# Patient Record
Sex: FEMALE | Race: BLACK OR AFRICAN AMERICAN | Employment: OTHER | URBAN - NONMETROPOLITAN AREA
[De-identification: names, ages, dates, MRNs, and addresses within clinical notes are randomized per-mention and may not be internally consistent; named-entity substitution may affect disease eponyms.]

---

## 2022-01-01 ENCOUNTER — HOSPITAL ENCOUNTER (EMERGENCY)
Age: 67
Discharge: ANOTHER HOSPITAL | End: 2022-11-10
Attending: INTERNAL MEDICINE | Admitting: EMERGENCY MEDICINE
Payer: MEDICARE

## 2022-01-01 ENCOUNTER — APPOINTMENT (OUTPATIENT)
Dept: GENERAL RADIOLOGY | Age: 67
End: 2022-01-01
Attending: INTERNAL MEDICINE
Payer: MEDICARE

## 2022-01-01 ENCOUNTER — HOSPITAL ENCOUNTER (OUTPATIENT)
Age: 67
Setting detail: OBSERVATION
End: 2022-11-21
Attending: EMERGENCY MEDICINE | Admitting: INTERNAL MEDICINE
Payer: MEDICARE

## 2022-01-01 VITALS
HEART RATE: 124 BPM | SYSTOLIC BLOOD PRESSURE: 146 MMHG | OXYGEN SATURATION: 95 % | BODY MASS INDEX: 16.01 KG/M2 | DIASTOLIC BLOOD PRESSURE: 70 MMHG | TEMPERATURE: 98.6 F | WEIGHT: 102 LBS | RESPIRATION RATE: 20 BRPM | HEIGHT: 67 IN

## 2022-01-01 VITALS
RESPIRATION RATE: 18 BRPM | OXYGEN SATURATION: 93 % | SYSTOLIC BLOOD PRESSURE: 95 MMHG | BODY MASS INDEX: 17.23 KG/M2 | DIASTOLIC BLOOD PRESSURE: 70 MMHG | WEIGHT: 110 LBS | TEMPERATURE: 97.6 F | HEART RATE: 82 BPM

## 2022-01-01 DIAGNOSIS — R69 TERMINAL ILLNESS: Primary | ICD-10-CM

## 2022-01-01 DIAGNOSIS — A41.9 SEPSIS WITHOUT ACUTE ORGAN DYSFUNCTION, DUE TO UNSPECIFIED ORGANISM (HCC): Primary | ICD-10-CM

## 2022-01-01 DIAGNOSIS — J18.9 PNEUMONIA OF RIGHT LUNG DUE TO INFECTIOUS ORGANISM, UNSPECIFIED PART OF LUNG: ICD-10-CM

## 2022-01-01 LAB
ALBUMIN SERPL-MCNC: 2 G/DL (ref 3.4–5)
ALBUMIN/GLOB SERPL: 0.4 {RATIO} (ref 0.8–1.7)
ALP SERPL-CCNC: 75 U/L (ref 45–117)
ALT SERPL-CCNC: 8 U/L (ref 13–56)
ANION GAP SERPL CALC-SCNC: 9 MMOL/L (ref 3–18)
AST SERPL W P-5'-P-CCNC: 7 U/L (ref 10–38)
ATRIAL RATE: 129 BPM
BACTERIA SPEC CULT: NORMAL
BACTERIA SPEC CULT: NORMAL
BASOPHILS # BLD: 0 K/UL (ref 0–0.1)
BASOPHILS NFR BLD: 0 % (ref 0–2)
BILIRUB SERPL-MCNC: 0.6 MG/DL (ref 0.2–1)
BUN SERPL-MCNC: 18 MG/DL (ref 7–18)
BUN/CREAT SERPL: 40 (ref 12–20)
CA-I BLD-MCNC: 8.4 MG/DL (ref 8.5–10.1)
CALCULATED P AXIS, ECG09: 67 DEGREES
CALCULATED R AXIS, ECG10: 62 DEGREES
CALCULATED T AXIS, ECG11: -87 DEGREES
CHLORIDE SERPL-SCNC: 104 MMOL/L (ref 100–111)
CO2 SERPL-SCNC: 27 MMOL/L (ref 21–32)
CREAT SERPL-MCNC: 0.45 MG/DL (ref 0.6–1.3)
DIAGNOSIS, 93000: NORMAL
DIFFERENTIAL METHOD BLD: ABNORMAL
EOSINOPHIL # BLD: 5.8 K/UL (ref 0–0.4)
EOSINOPHIL NFR BLD: 11 % (ref 0–5)
ERYTHROCYTE [DISTWIDTH] IN BLOOD BY AUTOMATED COUNT: 18.2 % (ref 11.6–14.5)
GLOBULIN SER CALC-MCNC: 5.1 G/DL (ref 2–4)
GLUCOSE SERPL-MCNC: 134 MG/DL (ref 74–99)
HCT VFR BLD AUTO: 26 % (ref 35–45)
HGB BLD-MCNC: 7.4 G/DL (ref 12–16)
IMM GRANULOCYTES # BLD AUTO: 0 K/UL
IMM GRANULOCYTES NFR BLD AUTO: 0 %
LACTATE SERPL-SCNC: 2.5 MMOL/L (ref 0.4–2)
LACTATE SERPL-SCNC: 2.9 MMOL/L (ref 0.4–2)
LIPASE SERPL-CCNC: 40 U/L (ref 73–393)
LYMPHOCYTES # BLD: 3.2 K/UL (ref 0.9–3.6)
LYMPHOCYTES NFR BLD: 6 % (ref 21–52)
MAGNESIUM SERPL-MCNC: 1.9 MG/DL (ref 1.6–2.6)
MCH RBC QN AUTO: 26.5 PG (ref 24–34)
MCHC RBC AUTO-ENTMCNC: 28.5 G/DL (ref 31–37)
MCV RBC AUTO: 93.2 FL (ref 78–100)
MONOCYTES # BLD: 8 K/UL (ref 0.05–1.2)
MONOCYTES NFR BLD: 15 % (ref 3–10)
NEUTS BAND NFR BLD MANUAL: 6 % (ref 0–5)
NEUTS SEG # BLD: 36 K/UL (ref 1.8–8)
NEUTS SEG NFR BLD: 62 % (ref 40–73)
NRBC # BLD: 0.02 K/UL (ref 0–0.01)
NRBC BLD-RTO: 0 PER 100 WBC
P-R INTERVAL, ECG05: 110 MS
PLATELET # BLD AUTO: 353 K/UL (ref 135–420)
PLATELET COMMENTS,PCOM: ABNORMAL
PMV BLD AUTO: 12.7 FL (ref 9.2–11.8)
POTASSIUM SERPL-SCNC: 3.4 MMOL/L (ref 3.5–5.5)
PROCALCITONIN SERPL-MCNC: 0.18 NG/ML
PROT SERPL-MCNC: 7.1 G/DL (ref 6.4–8.2)
Q-T INTERVAL, ECG07: 316 MS
QRS DURATION, ECG06: 71 MS
QTC CALCULATION (BEZET), ECG08: 463 MS
RBC # BLD AUTO: 2.79 M/UL (ref 4.2–5.3)
RBC MORPH BLD: ABNORMAL
SODIUM SERPL-SCNC: 140 MMOL/L (ref 136–145)
SPECIAL REQUESTS,SREQ: NORMAL
SPECIAL REQUESTS,SREQ: NORMAL
TROPONIN-HIGH SENSITIVITY: 9 NG/L (ref 0–54)
VENTRICULAR RATE, ECG03: 129 BPM
WBC # BLD AUTO: 53 K/UL (ref 4.6–13.2)

## 2022-01-01 PROCEDURE — 84145 PROCALCITONIN (PCT): CPT

## 2022-01-01 PROCEDURE — 74011250636 HC RX REV CODE- 250/636: Performed by: INTERNAL MEDICINE

## 2022-01-01 PROCEDURE — G0378 HOSPITAL OBSERVATION PER HR: HCPCS

## 2022-01-01 PROCEDURE — 83735 ASSAY OF MAGNESIUM: CPT

## 2022-01-01 PROCEDURE — 93005 ELECTROCARDIOGRAM TRACING: CPT

## 2022-01-01 PROCEDURE — 87040 BLOOD CULTURE FOR BACTERIA: CPT

## 2022-01-01 PROCEDURE — 96365 THER/PROPH/DIAG IV INF INIT: CPT

## 2022-01-01 PROCEDURE — 84484 ASSAY OF TROPONIN QUANT: CPT

## 2022-01-01 PROCEDURE — 83690 ASSAY OF LIPASE: CPT

## 2022-01-01 PROCEDURE — 96374 THER/PROPH/DIAG INJ IV PUSH: CPT

## 2022-01-01 PROCEDURE — 83605 ASSAY OF LACTIC ACID: CPT

## 2022-01-01 PROCEDURE — 71045 X-RAY EXAM CHEST 1 VIEW: CPT

## 2022-01-01 PROCEDURE — 96376 TX/PRO/DX INJ SAME DRUG ADON: CPT

## 2022-01-01 PROCEDURE — 80053 COMPREHEN METABOLIC PANEL: CPT

## 2022-01-01 PROCEDURE — 96375 TX/PRO/DX INJ NEW DRUG ADDON: CPT

## 2022-01-01 PROCEDURE — 85025 COMPLETE CBC W/AUTO DIFF WBC: CPT

## 2022-01-01 PROCEDURE — 74011250636 HC RX REV CODE- 250/636: Performed by: EMERGENCY MEDICINE

## 2022-01-01 PROCEDURE — 99285 EMERGENCY DEPT VISIT HI MDM: CPT

## 2022-01-01 PROCEDURE — 74011000258 HC RX REV CODE- 258: Performed by: INTERNAL MEDICINE

## 2022-01-01 RX ORDER — LEVOFLOXACIN 5 MG/ML
750 INJECTION, SOLUTION INTRAVENOUS EVERY 24 HOURS
Status: DISCONTINUED | OUTPATIENT
Start: 2022-01-01 | End: 2022-01-01

## 2022-01-01 RX ORDER — OXYCODONE AND ACETAMINOPHEN 5; 325 MG/1; MG/1
2 TABLET ORAL
Status: DISCONTINUED | OUTPATIENT
Start: 2022-01-01 | End: 2022-11-22 | Stop reason: HOSPADM

## 2022-01-01 RX ORDER — LEVOFLOXACIN 5 MG/ML
750 INJECTION, SOLUTION INTRAVENOUS ONCE
Status: COMPLETED | OUTPATIENT
Start: 2022-01-01 | End: 2022-01-01

## 2022-01-01 RX ORDER — SODIUM CHLORIDE 0.9 % (FLUSH) 0.9 %
5-10 SYRINGE (ML) INJECTION AS NEEDED
Status: DISCONTINUED | OUTPATIENT
Start: 2022-01-01 | End: 2022-01-01 | Stop reason: HOSPADM

## 2022-01-01 RX ORDER — MORPHINE SULFATE 2 MG/ML
2 INJECTION, SOLUTION INTRAMUSCULAR; INTRAVENOUS
Status: DISCONTINUED | OUTPATIENT
Start: 2022-01-01 | End: 2022-11-22 | Stop reason: HOSPADM

## 2022-01-01 RX ORDER — MORPHINE SULFATE 4 MG/ML
4 INJECTION, SOLUTION INTRAMUSCULAR; INTRAVENOUS
Status: DISPENSED | OUTPATIENT
Start: 2022-01-01 | End: 2022-01-01

## 2022-01-01 RX ORDER — ONDANSETRON 2 MG/ML
4 INJECTION INTRAMUSCULAR; INTRAVENOUS
Status: DISPENSED | OUTPATIENT
Start: 2022-01-01 | End: 2022-01-01

## 2022-01-01 RX ADMIN — VANCOMYCIN HYDROCHLORIDE 1000 MG: 1 INJECTION, POWDER, LYOPHILIZED, FOR SOLUTION INTRAVENOUS at 21:12

## 2022-01-01 RX ADMIN — MORPHINE SULFATE 2 MG: 2 INJECTION, SOLUTION INTRAMUSCULAR; INTRAVENOUS at 04:54

## 2022-01-01 RX ADMIN — MORPHINE SULFATE 2 MG: 2 INJECTION, SOLUTION INTRAMUSCULAR; INTRAVENOUS at 17:34

## 2022-01-01 RX ADMIN — SODIUM CHLORIDE 389 ML: 9 INJECTION, SOLUTION INTRAVENOUS at 17:18

## 2022-01-01 RX ADMIN — LEVOFLOXACIN 750 MG: 5 INJECTION, SOLUTION INTRAVENOUS at 18:20

## 2022-01-01 RX ADMIN — MORPHINE SULFATE 2 MG: 2 INJECTION, SOLUTION INTRAMUSCULAR; INTRAVENOUS at 08:25

## 2022-01-01 RX ADMIN — SODIUM CHLORIDE 1000 ML: 9 INJECTION, SOLUTION INTRAVENOUS at 17:16

## 2022-01-01 RX ADMIN — SODIUM CHLORIDE 500 ML: 9 INJECTION, SOLUTION INTRAVENOUS at 15:45

## 2022-01-01 RX ADMIN — PIPERACILLIN AND TAZOBACTAM 4.5 G: 4; .5 INJECTION, POWDER, FOR SOLUTION INTRAVENOUS at 17:29

## 2022-01-01 RX ADMIN — MORPHINE SULFATE 2 MG: 2 INJECTION, SOLUTION INTRAMUSCULAR; INTRAVENOUS at 15:36

## 2022-01-01 RX ADMIN — MORPHINE SULFATE 2 MG: 2 INJECTION, SOLUTION INTRAMUSCULAR; INTRAVENOUS at 02:50

## 2022-01-01 RX ADMIN — SODIUM CHLORIDE 1000 ML: 9 INJECTION, SOLUTION INTRAVENOUS at 20:23

## 2022-01-01 RX ADMIN — MORPHINE SULFATE 2 MG: 2 INJECTION, SOLUTION INTRAMUSCULAR; INTRAVENOUS at 19:09

## 2022-07-31 ENCOUNTER — APPOINTMENT (OUTPATIENT)
Dept: CT IMAGING | Age: 67
DRG: 312 | End: 2022-07-31
Attending: EMERGENCY MEDICINE
Payer: MEDICARE

## 2022-07-31 ENCOUNTER — APPOINTMENT (OUTPATIENT)
Dept: CT IMAGING | Age: 67
DRG: 312 | End: 2022-07-31
Attending: NURSE PRACTITIONER
Payer: MEDICARE

## 2022-07-31 ENCOUNTER — HOSPITAL ENCOUNTER (INPATIENT)
Age: 67
LOS: 2 days | Discharge: HOME HEALTH | DRG: 312 | End: 2022-08-02
Attending: EMERGENCY MEDICINE | Admitting: INTERNAL MEDICINE
Payer: MEDICARE

## 2022-07-31 ENCOUNTER — APPOINTMENT (OUTPATIENT)
Dept: GENERAL RADIOLOGY | Age: 67
DRG: 312 | End: 2022-07-31
Attending: EMERGENCY MEDICINE
Payer: MEDICARE

## 2022-07-31 DIAGNOSIS — R55 SYNCOPE AND COLLAPSE: ICD-10-CM

## 2022-07-31 DIAGNOSIS — U07.1 COVID-19: Primary | ICD-10-CM

## 2022-07-31 DIAGNOSIS — C34.11 MALIGNANT NEOPLASM OF UPPER LOBE OF RIGHT LUNG (HCC): ICD-10-CM

## 2022-07-31 PROBLEM — R91.8 LUNG MASS: Status: ACTIVE | Noted: 2022-07-31

## 2022-07-31 LAB
ALBUMIN SERPL-MCNC: 2.7 G/DL (ref 3.4–5)
ALBUMIN/GLOB SERPL: 0.6 {RATIO} (ref 0.8–1.7)
ALP SERPL-CCNC: 50 U/L (ref 45–117)
ALT SERPL-CCNC: 14 U/L (ref 13–56)
ANION GAP SERPL CALC-SCNC: 10 MMOL/L (ref 3–18)
APPEARANCE UR: CLEAR
AST SERPL W P-5'-P-CCNC: 33 U/L (ref 10–38)
ATRIAL RATE: 112 BPM
BACTERIA URNS QL MICRO: NEGATIVE /HPF
BASOPHILS # BLD: 0 K/UL (ref 0–0.1)
BASOPHILS NFR BLD: 0 % (ref 0–2)
BILIRUB SERPL-MCNC: 1 MG/DL (ref 0.2–1)
BILIRUB UR QL: NEGATIVE
BUN SERPL-MCNC: 20 MG/DL (ref 7–18)
BUN/CREAT SERPL: 19 (ref 12–20)
CA-I BLD-MCNC: 8.4 MG/DL (ref 8.5–10.1)
CALCULATED P AXIS, ECG09: 70 DEGREES
CALCULATED R AXIS, ECG10: 54 DEGREES
CALCULATED T AXIS, ECG11: 33 DEGREES
CHLORIDE SERPL-SCNC: 104 MMOL/L (ref 100–111)
CO2 SERPL-SCNC: 22 MMOL/L (ref 21–32)
COLOR UR: YELLOW
COVID-19 RAPID TEST, COVR: DETECTED
CREAT SERPL-MCNC: 1.05 MG/DL (ref 0.6–1.3)
DIAGNOSIS, 93000: NORMAL
DIFFERENTIAL METHOD BLD: ABNORMAL
EOSINOPHIL # BLD: 0.1 K/UL (ref 0–0.4)
EOSINOPHIL NFR BLD: 1 % (ref 0–5)
EPITH CASTS URNS QL MICRO: ABNORMAL /LPF (ref 0–20)
ERYTHROCYTE [DISTWIDTH] IN BLOOD BY AUTOMATED COUNT: 16.3 % (ref 11.6–14.5)
FLUAV AG NPH QL IA: NEGATIVE
FLUBV AG NOSE QL IA: NEGATIVE
GLOBULIN SER CALC-MCNC: 4.8 G/DL (ref 2–4)
GLUCOSE SERPL-MCNC: 133 MG/DL (ref 74–99)
GLUCOSE UR STRIP.AUTO-MCNC: NEGATIVE MG/DL
HCT VFR BLD AUTO: 27.9 % (ref 35–45)
HGB BLD-MCNC: 8.4 G/DL (ref 12–16)
HGB UR QL STRIP: NEGATIVE
IMM GRANULOCYTES # BLD AUTO: 0.1 K/UL (ref 0–0.04)
IMM GRANULOCYTES NFR BLD AUTO: 1 % (ref 0–0.5)
KETONES UR QL STRIP.AUTO: NEGATIVE MG/DL
LACTATE SERPL-SCNC: 0.8 MMOL/L (ref 0.4–2)
LACTATE SERPL-SCNC: 1.9 MMOL/L (ref 0.4–2)
LEUKOCYTE ESTERASE UR QL STRIP.AUTO: NEGATIVE
LYMPHOCYTES # BLD: 0.6 K/UL (ref 0.9–3.6)
LYMPHOCYTES NFR BLD: 5 % (ref 21–52)
MCH RBC QN AUTO: 26.3 PG (ref 24–34)
MCHC RBC AUTO-ENTMCNC: 30.1 G/DL (ref 31–37)
MCV RBC AUTO: 87.5 FL (ref 78–100)
MONOCYTES # BLD: 0.6 K/UL (ref 0.05–1.2)
MONOCYTES NFR BLD: 5 % (ref 3–10)
NEUTS SEG # BLD: 10.5 K/UL (ref 1.8–8)
NEUTS SEG NFR BLD: 88 % (ref 40–73)
NITRITE UR QL STRIP.AUTO: NEGATIVE
NRBC # BLD: 0 K/UL (ref 0–0.01)
NRBC BLD-RTO: 0 PER 100 WBC
P-R INTERVAL, ECG05: 117 MS
PH UR STRIP: 6 [PH] (ref 5–8)
PLATELET # BLD AUTO: 340 K/UL (ref 135–420)
PMV BLD AUTO: 12.1 FL (ref 9.2–11.8)
POTASSIUM SERPL-SCNC: 3.7 MMOL/L (ref 3.5–5.5)
PROT SERPL-MCNC: 7.5 G/DL (ref 6.4–8.2)
PROT UR STRIP-MCNC: ABNORMAL MG/DL
Q-T INTERVAL, ECG07: 303 MS
QRS DURATION, ECG06: 74 MS
QTC CALCULATION (BEZET), ECG08: 414 MS
RBC # BLD AUTO: 3.19 M/UL (ref 4.2–5.3)
RBC #/AREA URNS HPF: ABNORMAL /HPF (ref 0–2)
SODIUM SERPL-SCNC: 136 MMOL/L (ref 136–145)
SP GR UR REFRACTOMETRY: 1.02 (ref 1–1.03)
UROBILINOGEN UR QL STRIP.AUTO: 1 EU/DL (ref 0.2–1)
VENTRICULAR RATE, ECG03: 112 BPM
WBC # BLD AUTO: 12 K/UL (ref 4.6–13.2)
WBC URNS QL MICRO: ABNORMAL /HPF (ref 0–4)

## 2022-07-31 PROCEDURE — 70450 CT HEAD/BRAIN W/O DYE: CPT

## 2022-07-31 PROCEDURE — 87635 SARS-COV-2 COVID-19 AMP PRB: CPT

## 2022-07-31 PROCEDURE — 87040 BLOOD CULTURE FOR BACTERIA: CPT

## 2022-07-31 PROCEDURE — 80053 COMPREHEN METABOLIC PANEL: CPT

## 2022-07-31 PROCEDURE — 85025 COMPLETE CBC W/AUTO DIFF WBC: CPT

## 2022-07-31 PROCEDURE — 83605 ASSAY OF LACTIC ACID: CPT

## 2022-07-31 PROCEDURE — 74011250636 HC RX REV CODE- 250/636: Performed by: EMERGENCY MEDICINE

## 2022-07-31 PROCEDURE — 93005 ELECTROCARDIOGRAM TRACING: CPT

## 2022-07-31 PROCEDURE — 74011000636 HC RX REV CODE- 636: Performed by: EMERGENCY MEDICINE

## 2022-07-31 PROCEDURE — 87804 INFLUENZA ASSAY W/OPTIC: CPT

## 2022-07-31 PROCEDURE — 71275 CT ANGIOGRAPHY CHEST: CPT

## 2022-07-31 PROCEDURE — 74011250636 HC RX REV CODE- 250/636: Performed by: NURSE PRACTITIONER

## 2022-07-31 PROCEDURE — 74011000250 HC RX REV CODE- 250: Performed by: NURSE PRACTITIONER

## 2022-07-31 PROCEDURE — 81001 URINALYSIS AUTO W/SCOPE: CPT

## 2022-07-31 PROCEDURE — 99285 EMERGENCY DEPT VISIT HI MDM: CPT

## 2022-07-31 PROCEDURE — 74011250637 HC RX REV CODE- 250/637: Performed by: EMERGENCY MEDICINE

## 2022-07-31 PROCEDURE — 65270000029 HC RM PRIVATE

## 2022-07-31 PROCEDURE — 71045 X-RAY EXAM CHEST 1 VIEW: CPT

## 2022-07-31 RX ORDER — SODIUM CHLORIDE 9 MG/ML
75 INJECTION, SOLUTION INTRAVENOUS CONTINUOUS
Status: DISCONTINUED | OUTPATIENT
Start: 2022-07-31 | End: 2022-08-02

## 2022-07-31 RX ORDER — ONDANSETRON 4 MG/1
4 TABLET, ORALLY DISINTEGRATING ORAL
Status: DISCONTINUED | OUTPATIENT
Start: 2022-07-31 | End: 2022-08-02 | Stop reason: HOSPADM

## 2022-07-31 RX ORDER — SODIUM CHLORIDE 0.9 % (FLUSH) 0.9 %
5-10 SYRINGE (ML) INJECTION AS NEEDED
Status: DISCONTINUED | OUTPATIENT
Start: 2022-07-31 | End: 2022-08-02 | Stop reason: HOSPADM

## 2022-07-31 RX ORDER — SODIUM CHLORIDE 0.9 % (FLUSH) 0.9 %
5-40 SYRINGE (ML) INJECTION AS NEEDED
Status: DISCONTINUED | OUTPATIENT
Start: 2022-07-31 | End: 2022-08-02 | Stop reason: HOSPADM

## 2022-07-31 RX ORDER — ACETAMINOPHEN 325 MG/1
650 TABLET ORAL
Status: DISCONTINUED | OUTPATIENT
Start: 2022-07-31 | End: 2022-08-02 | Stop reason: HOSPADM

## 2022-07-31 RX ORDER — SODIUM CHLORIDE 0.9 % (FLUSH) 0.9 %
5-40 SYRINGE (ML) INJECTION EVERY 8 HOURS
Status: DISCONTINUED | OUTPATIENT
Start: 2022-07-31 | End: 2022-08-02 | Stop reason: HOSPADM

## 2022-07-31 RX ORDER — ONDANSETRON 2 MG/ML
4 INJECTION INTRAMUSCULAR; INTRAVENOUS
Status: DISCONTINUED | OUTPATIENT
Start: 2022-07-31 | End: 2022-08-02 | Stop reason: HOSPADM

## 2022-07-31 RX ORDER — POLYETHYLENE GLYCOL 3350 17 G/17G
17 POWDER, FOR SOLUTION ORAL DAILY PRN
Status: DISCONTINUED | OUTPATIENT
Start: 2022-07-31 | End: 2022-08-02 | Stop reason: HOSPADM

## 2022-07-31 RX ORDER — ACETAMINOPHEN 500 MG
1000 TABLET ORAL
Status: COMPLETED | OUTPATIENT
Start: 2022-07-31 | End: 2022-07-31

## 2022-07-31 RX ORDER — ACETAMINOPHEN 650 MG/1
650 SUPPOSITORY RECTAL
Status: DISCONTINUED | OUTPATIENT
Start: 2022-07-31 | End: 2022-08-02 | Stop reason: HOSPADM

## 2022-07-31 RX ORDER — ENOXAPARIN SODIUM 100 MG/ML
40 INJECTION SUBCUTANEOUS DAILY
Status: DISCONTINUED | OUTPATIENT
Start: 2022-08-01 | End: 2022-08-02

## 2022-07-31 RX ADMIN — IOPAMIDOL 100 ML: 755 INJECTION, SOLUTION INTRAVENOUS at 16:40

## 2022-07-31 RX ADMIN — ACETAMINOPHEN 1000 MG: 500 TABLET ORAL at 16:01

## 2022-07-31 RX ADMIN — SODIUM CHLORIDE 75 ML/HR: 9 INJECTION, SOLUTION INTRAVENOUS at 22:03

## 2022-07-31 RX ADMIN — SODIUM CHLORIDE, PRESERVATIVE FREE 10 ML: 5 INJECTION INTRAVENOUS at 22:03

## 2022-07-31 RX ADMIN — SODIUM CHLORIDE 1000 ML: 9 INJECTION, SOLUTION INTRAVENOUS at 16:00

## 2022-07-31 NOTE — ASSESSMENT & PLAN NOTE
-patient has been accepted to [facility], but no beds available at this time  -CT Chest: there is a large suprahilar right upper lobe mass measuring 8.3 cm, almost certainly representing primary malignancy. Additional metastatic right upper lobe pulmonary nodules. 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  Metastatic mediastinal and right hilar  lymphadenopathy

## 2022-07-31 NOTE — ED PROVIDER NOTES
The patient is a 73-year-old woman with past medical history significant for chronic bronchitis and hypertension who was brought to the ED today by EMS after having a syncopal event in [address] [place] [address].  She states that she was feeling weak and dizzy. After that she passed out. She states that she has been moving for the past 3 days and has not really had much to eat or drink. She is also complaining of a cough. She denies any nausea, vomiting or diarrhea. She also denies any chest pain or heart palpitations. Past Medical History:   Diagnosis Date    Hypertension        History reviewed. No pertinent surgical history. History reviewed. No pertinent family history. Social History     Socioeconomic History    Marital status: SINGLE     Spouse name: Not on file    Number of children: Not on file    Years of education: Not on file    Highest education level: Not on file   Occupational History    Not on file   Tobacco Use    Smoking status: Former     Packs/day: 0.25     Types: Cigarettes    Smokeless tobacco: Never   Vaping Use    Vaping Use: Not on file   Substance and Sexual Activity    Alcohol use: Not Currently    Drug use: Not Currently    Sexual activity: Not Currently   Other Topics Concern    Not on file   Social History Narrative    Not on file     Social Determinants of Health     Financial Resource Strain: Not on file   Food Insecurity: Not on file   Transportation Needs: Not on file   Physical Activity: Not on file   Stress: Not on file   Social Connections: Not on file   Intimate Partner Violence: Not on file   Housing Stability: Not on file         ALLERGIES: Patient has no known allergies. Review of Systems   All other systems reviewed and are negative.     Vitals:    07/31/22 1505 07/31/22 1508 07/31/22 1531   BP:  114/73    Pulse: (!) 116     Resp: 18     Temp: (!) 101.3 °F (38.5 °C)     SpO2: 98%  98%   Weight: 66.2 kg (146 lb)     Height: 5' 7\" (1.702 m)              Physical Exam  Vitals and nursing note reviewed. Constitutional:       Appearance: She is normal weight. HENT:      Head: Normocephalic and atraumatic. Right Ear: External ear normal.      Left Ear: External ear normal.      Nose: Nose normal.      Mouth/Throat:      Mouth: Mucous membranes are moist.      Pharynx: Oropharynx is clear. Eyes:      Extraocular Movements: Extraocular movements intact. Conjunctiva/sclera: Conjunctivae normal.      Pupils: Pupils are equal, round, and reactive to light. Cardiovascular:      Rate and Rhythm: Regular rhythm. Tachycardia present. Pulses: Normal pulses. Heart sounds: Normal heart sounds. Pulmonary:      Effort: Pulmonary effort is normal.      Breath sounds: Normal breath sounds. Abdominal:      General: Abdomen is flat. Bowel sounds are normal.      Palpations: Abdomen is soft. Musculoskeletal:         General: Normal range of motion. Cervical back: Normal range of motion and neck supple. Skin:     General: Skin is warm and dry. Capillary Refill: Capillary refill takes less than 2 seconds. Neurological:      General: No focal deficit present. Mental Status: She is alert and oriented to person, place, and time. Psychiatric:         Mood and Affect: Mood normal.         Behavior: Behavior normal.         Thought Content:  Thought content normal.         Judgment: Judgment normal.          Recent Results (from the past 12 hour(s))   EKG, 12 LEAD, INITIAL    Collection Time: 07/31/22  3:12 PM   Result Value Ref Range    Ventricular Rate 112 BPM    Atrial Rate 112 BPM    P-R Interval 117 ms    QRS Duration 74 ms    Q-T Interval 303 ms    QTC Calculation (Bezet) 414 ms    Calculated P Axis 70 degrees    Calculated R Axis 54 degrees    Calculated T Axis 33 degrees    Diagnosis       Sinus tachycardia  Probable left atrial enlargement     METABOLIC PANEL, COMPREHENSIVE    Collection Time: 07/31/22  3:45 PM   Result Value Ref Range Sodium 136 136 - 145 mmol/L    Potassium 3.7 3.5 - 5.5 mmol/L    Chloride 104 100 - 111 mmol/L    CO2 22 21 - 32 mmol/L    Anion gap 10 3.0 - 18.0 mmol/L    Glucose 133 (H) 74 - 99 mg/dL    BUN 20 (H) 7 - 18 mg/dL    Creatinine 1.05 0.60 - 1.30 mg/dL    BUN/Creatinine ratio 19 12 - 20      GFR est AA >60 >60 ml/min/1.73m2    GFR est non-AA 53 (L) >60 ml/min/1.73m2    Calcium 8.4 (L) 8.5 - 10.1 mg/dL    Bilirubin, total 1.0 0.2 - 1.0 mg/dL    AST (SGOT) 33 10 - 38 U/L    ALT (SGPT) 14 13 - 56 U/L    Alk. phosphatase 50 45 - 117 U/L    Protein, total 7.5 6.4 - 8.2 g/dL    Albumin 2.7 (L) 3.4 - 5.0 g/dL    Globulin 4.8 (H) 2.0 - 4.0 g/dL    A-G Ratio 0.6 (L) 0.8 - 1.7     CBC WITH AUTOMATED DIFF    Collection Time: 07/31/22  3:45 PM   Result Value Ref Range    WBC 12.0 4.6 - 13.2 K/uL    RBC 3.19 (L) 4.20 - 5.30 M/uL    HGB 8.4 (L) 12.0 - 16.0 g/dL    HCT 27.9 (L) 35.0 - 45.0 %    MCV 87.5 78.0 - 100.0 FL    MCH 26.3 24.0 - 34.0 PG    MCHC 30.1 (L) 31.0 - 37.0 g/dL    RDW 16.3 (H) 11.6 - 14.5 %    PLATELET 511 479 - 888 K/uL    MPV 12.1 (H) 9.2 - 11.8 FL    NRBC 0.0 0.0  WBC    ABSOLUTE NRBC 0.00 0.00 - 0.01 K/uL    NEUTROPHILS 88 (H) 40 - 73 %    LYMPHOCYTES 5 (L) 21 - 52 %    MONOCYTES 5 3 - 10 %    EOSINOPHILS 1 0 - 5 %    BASOPHILS 0 0 - 2 %    IMMATURE GRANULOCYTES 1 (H) 0 - 0.5 %    ABS. NEUTROPHILS 10.5 (H) 1.8 - 8.0 K/UL    ABS. LYMPHOCYTES 0.6 (L) 0.9 - 3.6 K/UL    ABS. MONOCYTES 0.6 0.05 - 1.2 K/UL    ABS. EOSINOPHILS 0.1 0.0 - 0.4 K/UL    ABS. BASOPHILS 0.0 0.0 - 0.1 K/UL    ABS. IMM.  GRANS. 0.1 (H) 0.00 - 0.04 K/UL    DF AUTOMATED     INFLUENZA A & B AG (RAPID TEST)    Collection Time: 07/31/22  4:00 PM   Result Value Ref Range    Influenza A Antigen Negative Negative      Influenza B Antigen Negative Negative     COVID-19 RAPID TEST    Collection Time: 07/31/22  4:00 PM   Result Value Ref Range    COVID-19 rapid test DETECTED (A) Not Detected     LACTIC ACID    Collection Time: 07/31/22  4:30 PM Result Value Ref Range    Lactic acid 1.9 0.4 - 2.0 mmol/L     CTA CHEST W OR W WO CONT   Final Result      1. Correlating with abnormality on prior chest radiograph, there is a large   suprahilar right upper lobe mass measuring 8.3 cm, almost certainly representing   primary malignancy. Additional metastatic right upper lobe pulmonary nodules. Interstitial thickening and nodularity throughout the right upper lobe likely   represents lymphangitic carcinomatosis. Metastatic mediastinal and right hilar   lymphadenopathy. Primary right upper lobe mass would be amenable to either   CT-guided core biopsy or tissue sampling via bronchoscopy. 2. No evidence of pulmonary thromboembolic disease. XR CHEST PORT   Final Result      Large perihilar right upper lobe mass, highly suspicious for primary malignancy. MDM  Number of Diagnoses or Management Options  COVID-19  Malignant neoplasm of upper lobe of right lung (HCC)  Syncope and collapse  Diagnosis management comments: The patient is a 61-year-old woman with past medical history significant for hypertension and bronchitis, who was brought to the ED today by EMS after having a syncopal event at the [facility].  She is noted to be febrile and tachycardic. Her source is COVID. It is not appropriate to give her antibiotics and 30 mL/kg of IV fluid. Additionally her chest x-ray showed a large perihilar right upper lobe mass that was suspicious for primary malignancy. Given the fact that she had these findings on her chest x-ray and I was concerned about a pulmonary embolus, I did do a CTA of the chest.  It was negative for a PE but she does have an 8.3 cm lung mass, with additional right upper lobe pulmonary nodules that appear to be metastatic, lymphangitic carcinomatosis, and most likely metastatic mediastinal and right hilar lymphadenopathy.     The patient has requested to be transferred to [facility] due to patient preference. Her family member does not feel that she can safely drive all the way out to [place]. The patient was accepted by the hospitalist at [facility] but there is a delay and did not have bed assignment there. The patient will be admitted to our facility while she is awaiting transfer.     An 8.3 cm       Critical Care    Date/Time: 8/5/2022 6:51 PM  Performed by: Kristina Ramos MD  Authorized by: Kristina Ramos MD     Critical care provider statement:     Critical care time (minutes):  35    Critical care was necessary to treat or prevent imminent or life-threatening deterioration of the following conditions:  Respiratory failure, cardiac failure, CNS failure or compromise and circulatory failure    Critical care was time spent personally by me on the following activities:  Blood draw for specimens, discussions with consultants, evaluation of patient's response to treatment, examination of patient, obtaining history from patient or surrogate, ordering and performing treatments and interventions, ordering and review of laboratory studies, ordering and review of radiographic studies, pulse oximetry and re-evaluation of patient's condition    I assumed direction of critical care for this patient from another provider in my specialty: no      Care discussed with: admitting provider

## 2022-07-31 NOTE — ED NOTES
Received care of patient from previous shift. Has been accepted at [facility] but no bed available tonight, will admit here tonight.

## 2022-08-01 ENCOUNTER — APPOINTMENT (OUTPATIENT)
Dept: NON INVASIVE DIAGNOSTICS | Age: 67
DRG: 312 | End: 2022-08-01
Attending: NURSE PRACTITIONER
Payer: MEDICARE

## 2022-08-01 LAB
ABO + RH BLD: NORMAL
ANION GAP SERPL CALC-SCNC: 7 MMOL/L (ref 3–18)
BLOOD GROUP ANTIBODIES SERPL: NEGATIVE
BUN SERPL-MCNC: 13 MG/DL (ref 7–18)
BUN/CREAT SERPL: 22 (ref 12–20)
CA-I BLD-MCNC: 7.8 MG/DL (ref 8.5–10.1)
CHLORIDE SERPL-SCNC: 111 MMOL/L (ref 100–111)
CO2 SERPL-SCNC: 23 MMOL/L (ref 21–32)
CREAT SERPL-MCNC: 0.58 MG/DL (ref 0.6–1.3)
ERYTHROCYTE [DISTWIDTH] IN BLOOD BY AUTOMATED COUNT: 16.4 % (ref 11.6–14.5)
FERRITIN SERPL-MCNC: 928 NG/ML (ref 8–388)
FOLATE SERPL-MCNC: 13.3 NG/ML (ref 3.1–17.5)
GLUCOSE SERPL-MCNC: 85 MG/DL (ref 74–99)
HCT VFR BLD AUTO: 23.6 % (ref 35–45)
HGB BLD-MCNC: 7.2 G/DL (ref 12–16)
IRON SATN MFR SERPL: 19 % (ref 20–50)
IRON SERPL-MCNC: 31 UG/DL (ref 50–175)
MAGNESIUM SERPL-MCNC: 1.7 MG/DL (ref 1.6–2.6)
MCH RBC QN AUTO: 26.3 PG (ref 24–34)
MCHC RBC AUTO-ENTMCNC: 30.5 G/DL (ref 31–37)
MCV RBC AUTO: 86.1 FL (ref 78–100)
NRBC # BLD: 0 K/UL (ref 0–0.01)
NRBC BLD-RTO: 0 PER 100 WBC
PLATELET # BLD AUTO: 249 K/UL (ref 135–420)
PMV BLD AUTO: 11.9 FL (ref 9.2–11.8)
POTASSIUM SERPL-SCNC: 3 MMOL/L (ref 3.5–5.5)
RBC # BLD AUTO: 2.74 M/UL (ref 4.2–5.3)
SODIUM SERPL-SCNC: 141 MMOL/L (ref 136–145)
SPECIMEN EXP DATE BLD: NORMAL
TIBC SERPL-MCNC: 164 UG/DL (ref 250–450)
VIT B12 SERPL-MCNC: 395 PG/ML (ref 211–911)
WBC # BLD AUTO: 6.6 K/UL (ref 4.6–13.2)

## 2022-08-01 PROCEDURE — 80048 BASIC METABOLIC PNL TOTAL CA: CPT

## 2022-08-01 PROCEDURE — 85027 COMPLETE CBC AUTOMATED: CPT

## 2022-08-01 PROCEDURE — 93306 TTE W/DOPPLER COMPLETE: CPT

## 2022-08-01 PROCEDURE — 36415 COLL VENOUS BLD VENIPUNCTURE: CPT

## 2022-08-01 PROCEDURE — 74011250637 HC RX REV CODE- 250/637: Performed by: NURSE PRACTITIONER

## 2022-08-01 PROCEDURE — 86900 BLOOD TYPING SEROLOGIC ABO: CPT

## 2022-08-01 PROCEDURE — 83540 ASSAY OF IRON: CPT

## 2022-08-01 PROCEDURE — 74011000250 HC RX REV CODE- 250: Performed by: NURSE PRACTITIONER

## 2022-08-01 PROCEDURE — 65270000029 HC RM PRIVATE

## 2022-08-01 PROCEDURE — 83735 ASSAY OF MAGNESIUM: CPT

## 2022-08-01 PROCEDURE — 82728 ASSAY OF FERRITIN: CPT

## 2022-08-01 PROCEDURE — 74011250636 HC RX REV CODE- 250/636: Performed by: NURSE PRACTITIONER

## 2022-08-01 PROCEDURE — 82607 VITAMIN B-12: CPT

## 2022-08-01 RX ORDER — LANOLIN ALCOHOL/MO/W.PET/CERES
1 CREAM (GRAM) TOPICAL
Status: DISCONTINUED | OUTPATIENT
Start: 2022-08-02 | End: 2022-08-01

## 2022-08-01 RX ORDER — AMLODIPINE BESYLATE 5 MG/1
5 TABLET ORAL DAILY
Status: DISCONTINUED | OUTPATIENT
Start: 2022-08-01 | End: 2022-08-02 | Stop reason: HOSPADM

## 2022-08-01 RX ORDER — POTASSIUM CHLORIDE 750 MG/1
40 TABLET, EXTENDED RELEASE ORAL 2 TIMES DAILY
Status: COMPLETED | OUTPATIENT
Start: 2022-08-01 | End: 2022-08-01

## 2022-08-01 RX ADMIN — AMLODIPINE BESYLATE 5 MG: 5 TABLET ORAL at 13:33

## 2022-08-01 RX ADMIN — SODIUM CHLORIDE 75 ML/HR: 9 INJECTION, SOLUTION INTRAVENOUS at 23:00

## 2022-08-01 RX ADMIN — SODIUM CHLORIDE, PRESERVATIVE FREE 10 ML: 5 INJECTION INTRAVENOUS at 21:33

## 2022-08-01 RX ADMIN — SODIUM CHLORIDE, PRESERVATIVE FREE 10 ML: 5 INJECTION INTRAVENOUS at 05:32

## 2022-08-01 RX ADMIN — POTASSIUM CHLORIDE 40 MEQ: 10 TABLET, EXTENDED RELEASE ORAL at 13:33

## 2022-08-01 RX ADMIN — ACETAMINOPHEN 650 MG: 325 TABLET ORAL at 23:49

## 2022-08-01 RX ADMIN — SODIUM CHLORIDE, PRESERVATIVE FREE 10 ML: 5 INJECTION INTRAVENOUS at 13:45

## 2022-08-01 RX ADMIN — POTASSIUM CHLORIDE 40 MEQ: 10 TABLET, EXTENDED RELEASE ORAL at 17:07

## 2022-08-01 RX ADMIN — SODIUM CHLORIDE 75 ML/HR: 9 INJECTION, SOLUTION INTRAVENOUS at 11:04

## 2022-08-01 NOTE — PROGRESS NOTES
2051 - Pt arrived to hospital room, ambulated independently to hospital bed. Pt expressed she needed to have BM. Telemetry monitor put on and pt given privacy to have BM. Snack provided as requested. 2140 - Rounded on pt, pt changed herself into hospital gown and is sitting up in chair, denies any pain or discomfort at this time. Admission questions completed by writer and assessment completed by charge nurse, Kumar Stewart RN. Pt tolerated well. Orthostatic V/s completed as ordered. IV hydration started as ordered per MAR. Pt assisted into bed and no other needs expressed at this time. Oriented pt to room and use of CB. Pt stated understanding. 100mL urine output and pt had large BM. CBWR.     2354 - V/s obtained by PCT. No needs expressed at this time. CBWR.     0315 - Hat in BR emptied for 350mL lesvia yellow urine. 0515 - V/s obtained, trash emptied and fresh ice water provided to pt. Pt denies any needs or c/o discomfort at this time. CBWR.     [id number] - Hospitalist notified of decreasing H&H. Writer was told she will look into chart.

## 2022-08-01 NOTE — ROUTINE PROCESS
TRANSFER - OUT REPORT:    Verbal report given to MALU Romano RN(name) on Dung Oakes  being transferred to [address] [place] [address] for routine progression of care       Report consisted of patients Situation, Background, Assessment and   Recommendations(SBAR). Information from the following report(s) SBAR, ED Summary, and Cardiac Rhythm NSR  was reviewed with the receiving nurse. Lines:   Peripheral IV 07/31/22 Left Wrist (Active)   Site Assessment Clean, dry, & intact 07/31/22 1530   Phlebitis Assessment 0 07/31/22 1530   Infiltration Assessment 0 07/31/22 1530   Dressing Status Clean, dry, & intact 07/31/22 1530   Hub Color/Line Status Blue 07/31/22 1530   Action Taken Blood drawn 07/31/22 1530        Opportunity for questions and clarification was provided.       Patient transported with:   Monitor  Tech

## 2022-08-01 NOTE — ASSESSMENT & PLAN NOTE
-start IV hydration  -EKG/ continuous telemetry monitoring   -check orthostatic BP  -I&O  -CBC, BMP  -ECHO ordered  -CT head: ordered

## 2022-08-01 NOTE — PROGRESS NOTES
Admission Medication Reconciliation:    Information obtained from:  [facility] [address]    Comments/Recommendations: Reviewed PTA medications and patient's allergies. No current fill history        Allergies:  Patient has no known allergies.     Significant PMH/Disease States:   Past Medical History:   Diagnosis Date    Hypertension      Chief Complaint for this Admission:    Chief Complaint   Patient presents with    Syncope     Prior to Admission Medications:   None       CHRISTIAN Alston

## 2022-08-01 NOTE — PROGRESS NOTES
Received report from Juancho Scott LPN. ECHO in progress at this time. 1800-Medication administered. Pt tolerated well. I's&O's completed.

## 2022-08-01 NOTE — PROGRESS NOTES
Progress Note  Date:8/1/2022       Room:[facility]  Patient Name:Era Mclain     YOB: 1955     Age:66 y.o. Subjective    Patient examined at bedside. No acute distress. Respirations even and unlabored. Denies SOB, stable on RA. Denies chest pain, nausea, fever, chills. Does report having a cough. Patient on droplet isolation for COVID-19 infection. According to patient she has a family hx of Lung cancer (mother). She admits to current tobacco use but states she doesn't smoke as much as she used to. Nurse reports patient to have elevated BP, patient admits she was on blood pressure medications but stopped taking them. Requesting antihypertensive medication. Patient currently waiting to be transferred to [facility] where oncology services may be provided for newly diagnosed lung mass. Review of Systems   Respiratory:  Positive for cough. All other systems reviewed and are negative. Objective           Vitals Last 24 Hours:  Patient Vitals for the past 24 hrs:   Temp Pulse Resp BP SpO2   08/01/22 1147 -- 91 -- -- --   08/01/22 1109 98 °F (36.7 °C) 93 18 (!) 176/86 98 %   08/01/22 0752 98.9 °F (37.2 °C) 87 18 (!) 170/88 98 %   08/01/22 0524 98.4 °F (36.9 °C) 91 -- (!) 169/89 97 %   08/01/22 0400 -- 71 -- -- --   08/01/22 0000 -- 75 -- -- --   07/31/22 2354 98.5 °F (36.9 °C) 75 17 113/64 98 %   07/31/22 2203 97.1 °F (36.2 °C) (!) 104 18 (!) 165/93 96 %   07/31/22 1951 99.3 °F (37.4 °C) 94 18 135/78 98 %   07/31/22 1849 -- 99 16 (!) 156/71 97 %   07/31/22 1800 -- 95 18 (!) 143/74 96 %   07/31/22 1700 -- 95 18 119/67 96 %   07/31/22 1531 -- -- -- -- 98 %   07/31/22 1508 -- -- -- 114/73 --   07/31/22 1505 (!) 101.3 °F (38.5 °C) (!) 116 18 -- 98 %        I/O (24Hr):     Intake/Output Summary (Last 24 hours) at 8/1/2022 1305  Last data filed at 8/1/2022 0400  Gross per 24 hour   Intake --   Output 450 ml   Net -450 ml       Physical Exam  Constitutional:       Appearance: Normal appearance. HENT:      Head: Normocephalic and atraumatic. Right Ear: External ear normal.      Left Ear: External ear normal.      Nose: Nose normal.      Mouth/Throat:      Mouth: Mucous membranes are moist.      Pharynx: Oropharynx is clear. Eyes:      Extraocular Movements: Extraocular movements intact. Conjunctiva/sclera: Conjunctivae normal.      Pupils: Pupils are equal, round, and reactive to light. Cardiovascular:      Rate and Rhythm: Normal rate and regular rhythm. Pulses: Normal pulses. Heart sounds: Normal heart sounds. Pulmonary:      Effort: Pulmonary effort is normal.      Breath sounds: Normal breath sounds. Abdominal:      General: Abdomen is flat. Bowel sounds are normal.      Palpations: Abdomen is soft. Musculoskeletal:         General: Normal range of motion. Cervical back: Normal range of motion and neck supple. Skin:     General: Skin is warm and dry. Neurological:      General: No focal deficit present. Mental Status: She is alert and oriented to person, place, and time.    Psychiatric:         Mood and Affect: Mood normal.         Behavior: Behavior normal.        Medications           Current Facility-Administered Medications   Medication Dose Route Frequency    potassium chloride (KLOR-CON M10) tablet 40 mEq  40 mEq Oral BID    sodium chloride (NS) flush 5-10 mL  5-10 mL IntraVENous PRN    sodium chloride (NS) flush 5-40 mL  5-40 mL IntraVENous Q8H    sodium chloride (NS) flush 5-40 mL  5-40 mL IntraVENous PRN    acetaminophen (TYLENOL) tablet 650 mg  650 mg Oral Q6H PRN    Or    acetaminophen (TYLENOL) suppository 650 mg  650 mg Rectal Q6H PRN    polyethylene glycol (MIRALAX) packet 17 g  17 g Oral DAILY PRN    ondansetron (ZOFRAN ODT) tablet 4 mg  4 mg Oral Q8H PRN    Or    ondansetron (ZOFRAN) injection 4 mg  4 mg IntraVENous Q6H PRN    [Held by provider] enoxaparin (LOVENOX) injection 40 mg  40 mg SubCUTAneous DAILY    0.9% sodium chloride infusion  75 mL/hr IntraVENous CONTINUOUS         Allergies         Patient has no known allergies. Labs/Imaging/Diagnostics      Labs:  Recent Results (from the past 24 hour(s))   EKG, 12 LEAD, INITIAL    Collection Time: 07/31/22  3:12 PM   Result Value Ref Range    Ventricular Rate 112 BPM    Atrial Rate 112 BPM    P-R Interval 117 ms    QRS Duration 74 ms    Q-T Interval 303 ms    QTC Calculation (Bezet) 414 ms    Calculated P Axis 70 degrees    Calculated R Axis 54 degrees    Calculated T Axis 33 degrees    Diagnosis       Sinus tachycardia  Probable left atrial enlargement     METABOLIC PANEL, COMPREHENSIVE    Collection Time: 07/31/22  3:45 PM   Result Value Ref Range    Sodium 136 136 - 145 mmol/L    Potassium 3.7 3.5 - 5.5 mmol/L    Chloride 104 100 - 111 mmol/L    CO2 22 21 - 32 mmol/L    Anion gap 10 3.0 - 18.0 mmol/L    Glucose 133 (H) 74 - 99 mg/dL    BUN 20 (H) 7 - 18 mg/dL    Creatinine 1.05 0.60 - 1.30 mg/dL    BUN/Creatinine ratio 19 12 - 20      GFR est AA >60 >60 ml/min/1.73m2    GFR est non-AA 53 (L) >60 ml/min/1.73m2    Calcium 8.4 (L) 8.5 - 10.1 mg/dL    Bilirubin, total 1.0 0.2 - 1.0 mg/dL    AST (SGOT) 33 10 - 38 U/L    ALT (SGPT) 14 13 - 56 U/L    Alk.  phosphatase 50 45 - 117 U/L    Protein, total 7.5 6.4 - 8.2 g/dL    Albumin 2.7 (L) 3.4 - 5.0 g/dL    Globulin 4.8 (H) 2.0 - 4.0 g/dL    A-G Ratio 0.6 (L) 0.8 - 1.7     CBC WITH AUTOMATED DIFF    Collection Time: 07/31/22  3:45 PM   Result Value Ref Range    WBC 12.0 4.6 - 13.2 K/uL    RBC 3.19 (L) 4.20 - 5.30 M/uL    HGB 8.4 (L) 12.0 - 16.0 g/dL    HCT 27.9 (L) 35.0 - 45.0 %    MCV 87.5 78.0 - 100.0 FL    MCH 26.3 24.0 - 34.0 PG    MCHC 30.1 (L) 31.0 - 37.0 g/dL    RDW 16.3 (H) 11.6 - 14.5 %    PLATELET 197 258 - 122 K/uL    MPV 12.1 (H) 9.2 - 11.8 FL    NRBC 0.0 0.0  WBC    ABSOLUTE NRBC 0.00 0.00 - 0.01 K/uL    NEUTROPHILS 88 (H) 40 - 73 %    LYMPHOCYTES 5 (L) 21 - 52 %    MONOCYTES 5 3 - 10 %    EOSINOPHILS 1 0 - 5 % BASOPHILS 0 0 - 2 %    IMMATURE GRANULOCYTES 1 (H) 0 - 0.5 %    ABS. NEUTROPHILS 10.5 (H) 1.8 - 8.0 K/UL    ABS. LYMPHOCYTES 0.6 (L) 0.9 - 3.6 K/UL    ABS. MONOCYTES 0.6 0.05 - 1.2 K/UL    ABS. EOSINOPHILS 0.1 0.0 - 0.4 K/UL    ABS. BASOPHILS 0.0 0.0 - 0.1 K/UL    ABS. IMM.  GRANS. 0.1 (H) 0.00 - 0.04 K/UL    DF AUTOMATED     INFLUENZA A & B AG (RAPID TEST)    Collection Time: 07/31/22  4:00 PM   Result Value Ref Range    Influenza A Antigen Negative Negative      Influenza B Antigen Negative Negative     COVID-19 RAPID TEST    Collection Time: 07/31/22  4:00 PM   Result Value Ref Range    COVID-19 rapid test DETECTED (A) Not Detected     CULTURE, BLOOD    Collection Time: 07/31/22  4:30 PM    Specimen: Blood   Result Value Ref Range    Special Requests: No Special Requests      Culture result: No growth after 17 hours     LACTIC ACID    Collection Time: 07/31/22  4:30 PM   Result Value Ref Range    Lactic acid 1.9 0.4 - 2.0 mmol/L   CULTURE, BLOOD    Collection Time: 07/31/22  4:33 PM    Specimen: Blood   Result Value Ref Range    Special Requests: No Special Requests      Culture result: No growth after 17 hours     URINALYSIS W/ RFLX MICROSCOPIC    Collection Time: 07/31/22  6:45 PM   Result Value Ref Range    Color Yellow      Appearance Clear      Specific gravity 1.024 1.005 - 1.030      pH (UA) 6.0 5.0 - 8.0      Protein Trace (A) Negative mg/dL    Glucose Negative Negative mg/dL    Ketone Negative Negative mg/dL    Bilirubin Negative Negative      Blood Negative Negative      Urobilinogen 1.0 0.2 - 1.0 EU/dL    Nitrites Negative Negative      Leukocyte Esterase Negative Negative     LACTIC ACID    Collection Time: 07/31/22  6:45 PM   Result Value Ref Range    Lactic acid 0.8 0.4 - 2.0 mmol/L   URINE MICROSCOPIC    Collection Time: 07/31/22  6:45 PM   Result Value Ref Range    WBC 0-4 0 - 4 /hpf    RBC 0-5 0 - 2 /hpf    Epithelial cells Moderate 0 - 20 /lpf    Bacteria Negative (A) None /hpf   METABOLIC PANEL, BASIC    Collection Time: 08/01/22  4:20 AM   Result Value Ref Range    Sodium 141 136 - 145 mmol/L    Potassium 3.0 (L) 3.5 - 5.5 mmol/L    Chloride 111 100 - 111 mmol/L    CO2 23 21 - 32 mmol/L    Anion gap 7 3.0 - 18.0 mmol/L    Glucose 85 74 - 99 mg/dL    BUN 13 7 - 18 mg/dL    Creatinine 0.58 (L) 0.60 - 1.30 mg/dL    BUN/Creatinine ratio 22 (H) 12 - 20      GFR est AA >60 >60 ml/min/1.73m2    GFR est non-AA >60 >60 ml/min/1.73m2    Calcium 7.8 (L) 8.5 - 10.1 mg/dL   MAGNESIUM    Collection Time: 08/01/22  4:20 AM   Result Value Ref Range    Magnesium 1.7 1.6 - 2.6 mg/dL   CBC W/O DIFF    Collection Time: 08/01/22  4:20 AM   Result Value Ref Range    WBC 6.6 4.6 - 13.2 K/uL    RBC 2.74 (L) 4.20 - 5.30 M/uL    HGB 7.2 (L) 12.0 - 16.0 g/dL    HCT 23.6 (L) 35.0 - 45.0 %    MCV 86.1 78.0 - 100.0 FL    MCH 26.3 24.0 - 34.0 PG    MCHC 30.5 (L) 31.0 - 37.0 g/dL    RDW 16.4 (H) 11.6 - 14.5 %    PLATELET 870 994 - 386 K/uL    MPV 11.9 (H) 9.2 - 11.8 FL    NRBC 0.0 0.0  WBC    ABSOLUTE NRBC 0.00 0.00 - 0.01 K/uL   IRON PROFILE    Collection Time: 08/01/22  4:20 AM   Result Value Ref Range    Iron 31 (L) 50 - 175 ug/dL    TIBC 164 (L) 250 - 450 ug/dL    Iron % saturation 19 (L) 20 - 50 %   VITAMIN B12 & FOLATE    Collection Time: 08/01/22  4:20 AM   Result Value Ref Range    Vitamin B12 395 211 - 911 pg/mL    Folate 13.3 3.10 - 17.50 ng/mL   FERRITIN    Collection Time: 08/01/22  4:20 AM   Result Value Ref Range    Ferritin 928 (H) 8 - 388 ng/mL   TYPE & SCREEN    Collection Time: 08/01/22  6:40 AM   Result Value Ref Range    Crossmatch Expiration 08/04/2022,2359     ABO/Rh(D) O Positive     Antibody screen Negative         Trended key labs include:  Recent Labs     08/01/22  0420 07/31/22  1545   WBC 6.6 12.0   HGB 7.2* 8.4*   HCT 23.6* 27.9*    340     Recent Labs     08/01/22 0420 07/31/22  1545    136   K 3.0* 3.7    104   CO2 23 22   GLU 85 133*   BUN 13 20*   CREA 0.58* 1.05   CA 7. 8* 8.4*   MG 1.7  --    ALB  --  2.7*   TBILI  --  1.0   ALT  --  14       Imaging Last 24 Hours:  CT HEAD WO CONT    Result Date: 7/31/2022  EXAM: CT HEAD, WITHOUT IV CONTRAST INDICATION: Prior syncopal episode COMPARISON: None TECHNIQUE: Multiple axial CT images of the head were obtained extending from the skull base through the vertex. Additional coronal and sagittal reformations were also performed. One or more dose reduction techniques were used on this CT: automated exposure control, adjustment of the mAs and/or kVp according to patient size, and iterative reconstruction techniques. The specific techniques used on this CT exam have been documented in the patient's electronic medical record. Digital Imaging and Communications in Medicine (DICOM) format image data are available to nonaffiliated external healthcare facilities or entities on a secure, media free, reciprocally searchable basis with patient authorization for at least a 12-month period after this study. _______________ FINDINGS: BRAIN AND POSTERIOR FOSSA: The sulci, folia, ventricles and basal cisterns are within normal limits for the patient's age. There is no intracranial hemorrhage, mass effect, or midline shift. There are no areas of abnormal parenchymal attenuation. EXTRA-AXIAL SPACES AND MENINGES: There are no abnormal extra-axial fluid collections. CALVARIUM: No acute osseous abnormality. OTHER: The visualized portions of the paranasal sinuses and mastoid air cells are clear. _______________     No CT evidence of an acute intracranial abnormality. CTA CHEST W OR W WO CONT    Result Date: 7/31/2022  EXAM: CTA CHEST W OR W WO CONT CLINICAL INDICATION/HISTORY: Large right lung mass on prior chest radiograph, syncopal episode COMPARISON: Chest radiograph same day TECHNIQUE: Thin section CT was performed through the chest during pulmonary arterial enhancement.   MIP 3D reconstructions were generated to increase sensitivity in the detection of pulmonary emboli. One or more dose reduction techniques were used on this CT: automated exposure control, adjustment of the mAs and/or kVp according to patient size, and iterative reconstruction techniques. The specific techniques used on this CT exam have been documented in the patient's electronic medical record. Digital Imaging and Communications in Medicine (DICOM) format image data are available to nonaffiliated external healthcare facilities or entities on a secure, media free, reciprocally searchable basis with patient authorization for at least a 12-month period after this study. --- EXAM QUALITY --- 1. Overall exam quality- satisfactory: adequate 2. Adequacy of pulmonary enhancement-adequate: sufficient enhancement for diagnosis down to and including subsegmental vessels. Main PA density 190-250 HU 3. Adequacy of breath hold- adequate: sufficient enough to render diagnosis 4. There are no significant noise, beam hardening, streak artifacts affecting scan quality. _______________ FINDINGS: ---  PULMONARY CT ANGIOGRAM  --- PULMONARY VASCULATURE: The right and left central, primary segmental and subsegmental pulmonary arteries appear patent. There is no convincing evidence of intraluminal filling defect identified to suggest pulmonary embolism. THORACIC AORTA: Normal caliber thoracic aorta. No aortic aneurysm, intramural hematoma or dissection. ---  CT CHEST --- LUNG PARENCHYMA:   Right lung apex pulmonary nodule (image 20) measures 10 x 7 mm in size. Large suprahilar right upper lobe mass, correlating with prior findings on chest radiograph measures approximately 8.3 x 7.2 cm. There is an additional spiculated nodule within the anteroinferior aspect of the right upper lobe (image 62), measuring 1.8 x 2.2 cm. Bilobed mass is seen along the posterior aspect of the right upper lobe (image 34), measuring 1.1 x 2.2 cm.  A few regions of interstitial thickening and nodularity throughout inferior and inferolateral aspects of the right upper lobe suggest lymphangitic carcinomatosis. Emphysematous changes are seen elsewhere throughout the lungs. No suspicious pulmonary nodule or mass throughout the left lung or right lower lobe. PLEURAL SPACES:   No pleural effusion or pneumothorax. MEDIASTINUM:   Enlarged precarinal mediastinal lymph node measures 2.4 cm short axis. Enlarged subcarinal lymph node measures 1.5 cm short axis. There are several enlarged right hilar lymph nodes, measuring up to 2.2 cm short axis. No supraclavicular or left hilar lymphadenopathy. No axillary lymphadenopathy. No global cardiomegaly. No pericardial effusion. OSSEOUS STRUCTURES:   No acute osseous abnormality. Mild thoracic degenerative disk disease. UPPER ABDOMEN: There are a few simple appearing left renal cysts. No follow-up imaging is recommended as incidental lesions are likely benign. _______________     1. Correlating with abnormality on prior chest radiograph, there is a large suprahilar right upper lobe mass measuring 8.3 cm, almost certainly representing primary malignancy. Additional metastatic right upper lobe pulmonary nodules. 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 Metastatic mediastinal and right hilar lymphadenopathy. Primary right upper lobe mass would be amenable to either CT-guided core biopsy or tissue sampling via bronchoscopy. 2. No evidence of pulmonary thromboembolic disease. XR CHEST PORT    Result Date: 7/31/2022  EXAM: CHEST RADIOGRAPH, SINGLE VIEW CLINICAL INDICATION/HISTORY: Fever, sepsis COMPARISON: None. TECHNIQUE: Portable frontal view of the chest was obtained. _______________ FINDINGS: SUPPORT DEVICES: None. HEART AND MEDIASTINUM: Cardiomediastinal silhouette appears within normal limits. Normal caliber thoracic aorta. No central vascular congestion.  LUNGS AND PLEURAL SPACES: Large mass obscures medial inferior aspects of the right upper lobe, silhouetting the adjacent right hilum. Remaining lung parenchyma appears otherwise adequately aerated. No pleural effusion or pneumothorax. BONY THORAX AND SOFT TISSUES: No acute osseous abnormality. _______________     Large perihilar right upper lobe mass, highly suspicious for primary malignancy. Assessment//Plan           Patient Active Problem List    Diagnosis Date Noted    Lung mass 07/31/2022    COVID-19 07/31/2022    Syncope and collapse 07/31/2022      Syncope and collapse  -continue IV hydration  -EKG showed sinus tach  -orthostatic BP negative  -Monitor I&O  -ECHO pending  -CT head negative for acute intracranial abnormality      Lung mass  -patient has been accepted to [facility], but no beds available at this time. Transfer center stated they will contact [facility] around 3pm for follow-up on available bed  -CT Chest: there is a large suprahilar right upper lobe mass measuring 8.3 cm, almost certainly representing primary malignancy. Additional metastatic right upper lobe pulmonary nodules. 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 Metastatic mediastinal and right hilar lymphadenopathy  -CXR: Large perihilar right upper lobe mass, highly suspicious for primary malignancy     COVID-19  -confirmed on 7/31/22  -CXR: Large perihilar right upper lobe mass, highly suspicious for primary malignancy  -supplemental O2 as needed, keep O2 saturation >92%, currently stable on room air  -Afebrile, PRN tylenol for fever  -Continue droplet plus     Hypokalemia  -k 3.0  -Potassium 40mEq x2 today, repeat BMP in am if still here    Anemia of Chronic disease  -Possibly related to CA, iron panel does not correlate with iron deficiency   -H/H 7.2/23.6  -Monitor CBC  -Transfuse for Hgb less than 7    Hypertension   -Last /86  -Patient has hx but stopped taking medication at own discretion.  Now wants blood pressure medication.  -Start amlodipine 5mg daily      Code status: Full code  Prophylaxis: Lovenox on hold    Clinical time 30 minutes with >50% of visit spent in counseling and coordination of care      Electronically signed by Sandie Chairez.  Tremaine Clark MSN, [id number] [facility],1[facility] on 8/1/2022 at 1:05 PM

## 2022-08-01 NOTE — PROGRESS NOTES
Care Management Interventions  PCP Verified by CM: No (Pt will need to be assigned a PCP prior to discharge. )  Palliative Care Criteria Met (RRAT>21 & CHF Dx)?: No  Mode of Transport at Discharge: Other (see comment) (Family)  Transition of Care Consult (CM Consult): Discharge Planning  MyChart Signup: No  Discharge Durable Medical Equipment: No  Health Maintenance Reviewed: Yes  Physical Therapy Consult: No  Occupational Therapy Consult: No  Speech Therapy Consult: No  Support Systems: Child(macy)  Confirm Follow Up Transport: Family  The Patient and/or Patient Representative was Provided with a Choice of Provider and Agrees with the Discharge Plan?: Yes  Freedom of Choice List was Provided with Basic Dialogue that Supports the Patient's Individualized Plan of Care/Goals, Treatment Preferences and Shares the Quality Data Associated with the Providers?: Yes  Discharge Location  Patient Expects to be Discharged to[de-identified] Home  Reason for Admission: Chart reviewed and noted patient presents with C/O syncope and collapse. Pt states she was  out shopping in the [address] [place] [address], when she became dizzy and then fainted. She is also having weakness, fatigue and a dry cough. Rapid Covid- Positive. CTA Chest shows correlating with abnormality on prior chest radiograph, there is a large suprahilar right upper lobe mass measuring 8.3 cm, almost certainly representing primary malignancy. Additional metastatic right upper lobe pulmonary nodules. 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 Metastatic mediastinal and right hilar lymphadenopathy. CXR shows large perihilar right upper lobe mass, highly suspicious for primary malignancy.      Dx: Syncope and Collapse, Lung Mass, Covid-19     PMH: HTN                     RUR Score: 12%                   Plan for utilizing home health: TBD         PCP: First and Last name:  None- Pt needs to be assigned a PCP prior to discharge. Name of Practice:    Are you a current patient: Yes/No:    Approximate date of last visit:    Can you participate in a virtual visit with your PCP:                     Current Advanced Directive/Advance Care Plan: Full Code- No ACP      Healthcare Decision Maker: Zakia Kline- [phone]   Click here to complete [phone] including selection of the Healthcare Decision Maker Relationship (ie \"Primary\")                             Transition of Care Plan: Discharge planning is aimed at transition to home. Patient lives at home alone. Discharge planning with patient education and Medication Reconciliation. Pt needs to be assigned a PCP prior to discharge. Nurse will need to make appointments. Patient will be returning back home at discharge.

## 2022-08-02 VITALS
HEART RATE: 107 BPM | SYSTOLIC BLOOD PRESSURE: 165 MMHG | OXYGEN SATURATION: 100 % | HEIGHT: 67 IN | WEIGHT: 127 LBS | RESPIRATION RATE: 16 BRPM | BODY MASS INDEX: 19.93 KG/M2 | DIASTOLIC BLOOD PRESSURE: 102 MMHG | TEMPERATURE: 98.1 F

## 2022-08-02 LAB
ANION GAP SERPL CALC-SCNC: 7 MMOL/L (ref 3–18)
BASOPHILS # BLD: 0 K/UL (ref 0–0.1)
BASOPHILS NFR BLD: 0 % (ref 0–2)
BLASTS NFR BLD MANUAL: 0 %
BUN SERPL-MCNC: 5 MG/DL (ref 7–18)
BUN/CREAT SERPL: 9 (ref 12–20)
CA-I BLD-MCNC: 7.8 MG/DL (ref 8.5–10.1)
CHLORIDE SERPL-SCNC: 111 MMOL/L (ref 100–111)
CO2 SERPL-SCNC: 24 MMOL/L (ref 21–32)
CREAT SERPL-MCNC: 0.53 MG/DL (ref 0.6–1.3)
DIFFERENTIAL METHOD BLD: ABNORMAL
ECHO AO ASC DIAM: 2.9 CM
ECHO AO ASCENDING AORTA INDEX: 1.74 CM/M2
ECHO AO ROOT DIAM: 3.3 CM
ECHO AO ROOT INDEX: 1.98 CM/M2
ECHO AV AREA PEAK VELOCITY: 3.6 CM2
ECHO AV AREA VTI: 3.4 CM2
ECHO AV AREA/BSA PEAK VELOCITY: 2.2 CM2/M2
ECHO AV AREA/BSA VTI: 2 CM2/M2
ECHO AV MEAN GRADIENT: 3 MMHG
ECHO AV MEAN VELOCITY: 0.8 M/S
ECHO AV PEAK GRADIENT: 6 MMHG
ECHO AV PEAK VELOCITY: 1.2 M/S
ECHO AV VELOCITY RATIO: 0.83
ECHO AV VTI: 24 CM
ECHO EST RA PRESSURE: 3 MMHG
ECHO IVC PROX: 1.4 CM
ECHO LA AREA 2C: 20.9 CM2
ECHO LA AREA 4C: 22 CM2
ECHO LA DIAMETER INDEX: 2.22 CM/M2
ECHO LA DIAMETER: 3.7 CM
ECHO LA MAJOR AXIS: 6 CM
ECHO LA MINOR AXIS: 5.9 CM
ECHO LA TO AORTIC ROOT RATIO: 1.12
ECHO LA VOL BP: 63 ML (ref 22–52)
ECHO LA VOL/BSA BIPLANE: 38 ML/M2 (ref 16–34)
ECHO LV E' LATERAL VELOCITY: 9 CM/S
ECHO LV E' SEPTAL VELOCITY: 8 CM/S
ECHO LV EDV A2C: 55 ML
ECHO LV EDV A4C: 69 ML
ECHO LV EDV INDEX A4C: 41 ML/M2
ECHO LV EDV NDEX A2C: 33 ML/M2
ECHO LV EJECTION FRACTION A2C: 61 %
ECHO LV EJECTION FRACTION A4C: 60 %
ECHO LV EJECTION FRACTION BIPLANE: 61 % (ref 55–100)
ECHO LV ESV A2C: 21 ML
ECHO LV ESV A4C: 28 ML
ECHO LV ESV INDEX A2C: 13 ML/M2
ECHO LV ESV INDEX A4C: 17 ML/M2
ECHO LV FRACTIONAL SHORTENING: 32 % (ref 28–44)
ECHO LV INTERNAL DIMENSION DIASTOLE INDEX: 2.81 CM/M2
ECHO LV INTERNAL DIMENSION DIASTOLIC: 4.7 CM (ref 3.9–5.3)
ECHO LV INTERNAL DIMENSION SYSTOLIC INDEX: 1.92 CM/M2
ECHO LV INTERNAL DIMENSION SYSTOLIC: 3.2 CM
ECHO LV IVSD: 1 CM (ref 0.6–0.9)
ECHO LV MASS 2D: 187.5 G (ref 67–162)
ECHO LV MASS INDEX 2D: 112.3 G/M2 (ref 43–95)
ECHO LV POSTERIOR WALL DIASTOLIC: 1.2 CM (ref 0.6–0.9)
ECHO LV RELATIVE WALL THICKNESS RATIO: 0.51
ECHO LVOT AREA: 4.2 CM2
ECHO LVOT AV VTI INDEX: 0.82
ECHO LVOT DIAM: 2.3 CM
ECHO LVOT MEAN GRADIENT: 2 MMHG
ECHO LVOT PEAK GRADIENT: 4 MMHG
ECHO LVOT PEAK VELOCITY: 1 M/S
ECHO LVOT STROKE VOLUME INDEX: 49 ML/M2
ECHO LVOT SV: 81.8 ML
ECHO LVOT VTI: 19.7 CM
ECHO MV A VELOCITY: 0.72 M/S
ECHO MV AREA VTI: 4 CM2
ECHO MV E DECELERATION TIME (DT): 306 MS
ECHO MV E VELOCITY: 0.81 M/S
ECHO MV E/A RATIO: 1.13
ECHO MV E/E' LATERAL: 9
ECHO MV E/E' RATIO (AVERAGED): 9.56
ECHO MV E/E' SEPTAL: 10.13
ECHO MV LVOT VTI INDEX: 1.05
ECHO MV MAX VELOCITY: 1 M/S
ECHO MV MEAN GRADIENT: 2 MMHG
ECHO MV MEAN VELOCITY: 0.7 M/S
ECHO MV PEAK GRADIENT: 4 MMHG
ECHO MV VTI: 20.7 CM
ECHO PV MAX VELOCITY: 1 M/S
ECHO PV PEAK GRADIENT: 4 MMHG
ECHO RA AREA 4C: 15.9 CM2
ECHO RA END SYSTOLIC VOLUME APICAL 4 CHAMBER INDEX BSA: 26 ML/M2
ECHO RA VOLUME: 44 ML
ECHO RIGHT VENTRICULAR SYSTOLIC PRESSURE (RVSP): 33 MMHG
ECHO RV BASAL DIMENSION: 3 CM
ECHO RV LONGITUDINAL DIMENSION: 4.9 CM
ECHO RV MID DIMENSION: 2.4 CM
ECHO RV TAPSE: 1.8 CM (ref 1.7–?)
ECHO TV REGURGITANT MAX VELOCITY: 2.74 M/S
ECHO TV REGURGITANT PEAK GRADIENT: 30 MMHG
EOSINOPHIL # BLD: 0.2 K/UL (ref 0–0.4)
EOSINOPHIL NFR BLD: 2 % (ref 0–5)
ERYTHROCYTE [DISTWIDTH] IN BLOOD BY AUTOMATED COUNT: 16.4 % (ref 11.6–14.5)
GLUCOSE SERPL-MCNC: 86 MG/DL (ref 74–99)
HCT VFR BLD AUTO: 24.9 % (ref 35–45)
HGB BLD-MCNC: 7.5 G/DL (ref 12–16)
IMM GRANULOCYTES # BLD AUTO: 0 K/UL
IMM GRANULOCYTES NFR BLD AUTO: 0 %
LYMPHOCYTES # BLD: 0.7 K/UL (ref 0.9–3.6)
LYMPHOCYTES NFR BLD: 9 % (ref 21–52)
MAGNESIUM SERPL-MCNC: 1.4 MG/DL (ref 1.6–2.6)
MAGNESIUM SERPL-MCNC: 1.9 MG/DL (ref 1.6–2.6)
MANUAL DIFFERENTIAL PERFORMED BLD QL: ABNORMAL
MCH RBC QN AUTO: 26.7 PG (ref 24–34)
MCHC RBC AUTO-ENTMCNC: 30.1 G/DL (ref 31–37)
MCV RBC AUTO: 88.6 FL (ref 78–100)
METAMYELOCYTES NFR BLD MANUAL: 0 %
MONOCYTES # BLD: 0.5 K/UL (ref 0.05–1.2)
MONOCYTES NFR BLD: 7 % (ref 3–10)
MYELOCYTES NFR BLD MANUAL: 0 %
NEUTS BAND NFR BLD MANUAL: 0 % (ref 0–5)
NEUTS SEG # BLD: 6.1 K/UL (ref 1.8–8)
NEUTS SEG NFR BLD: 82 % (ref 40–73)
NRBC # BLD: 0 K/UL (ref 0–0.01)
NRBC BLD-RTO: 0 PER 100 WBC
OTHER CELLS NFR BLD MANUAL: 0 %
PLATELET # BLD AUTO: 264 K/UL (ref 135–420)
PMV BLD AUTO: 11.8 FL (ref 9.2–11.8)
POTASSIUM SERPL-SCNC: 3.1 MMOL/L (ref 3.5–5.5)
POTASSIUM SERPL-SCNC: 3.1 MMOL/L (ref 3.5–5.5)
PROMYELOCYTES NFR BLD MANUAL: 0 %
RBC # BLD AUTO: 2.81 M/UL (ref 4.2–5.3)
RBC MORPH BLD: ABNORMAL
SODIUM SERPL-SCNC: 142 MMOL/L (ref 136–145)
WBC # BLD AUTO: 7.5 K/UL (ref 4.6–13.2)

## 2022-08-02 PROCEDURE — 83735 ASSAY OF MAGNESIUM: CPT

## 2022-08-02 PROCEDURE — 36415 COLL VENOUS BLD VENIPUNCTURE: CPT

## 2022-08-02 PROCEDURE — 74011250637 HC RX REV CODE- 250/637: Performed by: NURSE PRACTITIONER

## 2022-08-02 PROCEDURE — 74011250636 HC RX REV CODE- 250/636: Performed by: NURSE PRACTITIONER

## 2022-08-02 PROCEDURE — 85027 COMPLETE CBC AUTOMATED: CPT

## 2022-08-02 PROCEDURE — 74011000250 HC RX REV CODE- 250: Performed by: NURSE PRACTITIONER

## 2022-08-02 PROCEDURE — 84132 ASSAY OF SERUM POTASSIUM: CPT

## 2022-08-02 RX ORDER — MAGNESIUM SULFATE HEPTAHYDRATE 40 MG/ML
2 INJECTION, SOLUTION INTRAVENOUS ONCE
Status: COMPLETED | OUTPATIENT
Start: 2022-08-02 | End: 2022-08-02

## 2022-08-02 RX ORDER — AMLODIPINE BESYLATE 5 MG/1
5 TABLET ORAL DAILY
Qty: 60 TABLET | Refills: 0 | Status: SHIPPED | OUTPATIENT
Start: 2022-08-03

## 2022-08-02 RX ORDER — POTASSIUM CHLORIDE 750 MG/1
40 TABLET, EXTENDED RELEASE ORAL EVERY 4 HOURS
Status: COMPLETED | OUTPATIENT
Start: 2022-08-02 | End: 2022-08-02

## 2022-08-02 RX ORDER — POTASSIUM CHLORIDE 750 MG/1
20 TABLET, EXTENDED RELEASE ORAL DAILY
Status: DISCONTINUED | OUTPATIENT
Start: 2022-08-03 | End: 2022-08-02 | Stop reason: HOSPADM

## 2022-08-02 RX ORDER — POTASSIUM CHLORIDE 20 MEQ/1
20 TABLET, EXTENDED RELEASE ORAL DAILY
Qty: 7 TABLET | Refills: 0 | Status: SHIPPED | OUTPATIENT
Start: 2022-08-03 | End: 2022-08-10

## 2022-08-02 RX ADMIN — POTASSIUM CHLORIDE 40 MEQ: 10 TABLET, EXTENDED RELEASE ORAL at 13:49

## 2022-08-02 RX ADMIN — POTASSIUM CHLORIDE 40 MEQ: 10 TABLET, EXTENDED RELEASE ORAL at 16:08

## 2022-08-02 RX ADMIN — AMLODIPINE BESYLATE 5 MG: 5 TABLET ORAL at 08:46

## 2022-08-02 RX ADMIN — SODIUM CHLORIDE, PRESERVATIVE FREE 10 ML: 5 INJECTION INTRAVENOUS at 13:49

## 2022-08-02 RX ADMIN — MAGNESIUM SULFATE HEPTAHYDRATE 2 G: 40 INJECTION, SOLUTION INTRAVENOUS at 13:47

## 2022-08-02 NOTE — PROGRESS NOTES
0700- bedside shift report completed and care of the patient    0849- AM medications administered, coffee provided    1130- lunch provided    1219- np lesvia in room    1424- attempt made to make appointment with [facility], left message with office after severl phone calls. .. [facility]    2750- patient left AMA, could not wait for potassium and magnesium level to result, says it is too hot in room to stay and daughter is outside.  NP lesvia aware, follow up appoitclairetn prpovided to patient, iv and telemetry removed, patient understands she is leaving against medical advice and states that if something happens she will [facility] to [facility]

## 2022-08-02 NOTE — PROGRESS NOTES
1930  Received care of pt lying in bed watching TV. Routine assessment and vs completed. Pt is alert and oriented and complaint free at this time. Call bell in reach and bed in lowest position    2200  Pt ambulating to bathroom without difficulty and no complaints verbalized    2349  Pt sleeping and awakens easily for VS.  Temp 101.1  Pt is complaint free at this time. Tylenol tabs 2 po given for same. [address] bed control called floor to notify nurse that there are still no beds currently available for transfer to [address] [place] [address] at this time and that they would contact floor again in am for update    0045  Temp 98.9. No complaints verbalized    0400  Lab in and am labs obtained    0600  Pt awake watching TV with no complaints verbalized. 6801  When reviewing pts chart noted that H/H was low on 8/1 and no CBC was ordered for today. Provider notified and order received to obtain CBC this am. Order entered.

## 2022-08-02 NOTE — PROGRESS NOTES
Problem: Airway Clearance - Ineffective  Goal: Achieve or maintain patent airway  Outcome: Progressing Towards Goal     Problem: Gas Exchange - Impaired  Goal: Absence of hypoxia  Outcome: Progressing Towards Goal  Goal: Promote optimal lung function  Outcome: Progressing Towards Goal     Problem: Breathing Pattern - Ineffective  Goal: Ability to achieve and maintain a regular respiratory rate  Outcome: Progressing Towards Goal     Problem: Body Temperature -  Risk of, Imbalanced  Goal: Ability to maintain a body temperature within defined limits  Outcome: Progressing Towards Goal  Goal: Will regain or maintain usual level of consciousness  Outcome: Progressing Towards Goal  Goal: Complications related to the disease process, condition or treatment will be avoided or minimized  Outcome: Progressing Towards Goal     Problem: Isolation Precautions - Risk of Spread of Infection  Goal: Prevent transmission of infectious organism to others  Outcome: Progressing Towards Goal     Problem: Nutrition Deficits  Goal: Optimize nutrtional status  Outcome: Progressing Towards Goal     Problem: Risk for Fluid Volume Deficit  Goal: Maintain normal heart rhythm  Outcome: Progressing Towards Goal  Goal: Maintain absence of muscle cramping  Outcome: Progressing Towards Goal  Goal: Maintain normal serum potassium, sodium, calcium, phosphorus, and pH  Outcome: Progressing Towards Goal     Problem: Loneliness or Risk for Loneliness  Goal: Demonstrate positive use of time alone when socialization is not possible  Outcome: Progressing Towards Goal     Problem: Fatigue  Goal: Verbalize increase energy and improved vitality  Outcome: Progressing Towards Goal     Problem: Patient Education: Go to Patient Education Activity  Goal: Patient/Family Education  Outcome: Progressing Towards Goal     Problem: Falls - Risk of  Goal: *Absence of Falls  Description: Document Rosa M Fall Risk and appropriate interventions in the flowsheet.   Outcome: Progressing Towards Goal  Note: Fall Risk Interventions:                      History of Falls Interventions: Bed/chair exit alarm         Problem: Patient Education: Go to Patient Education Activity  Goal: Patient/Family Education  Outcome: Progressing Towards Goal

## 2022-08-02 NOTE — PROGRESS NOTES
Problem: Airway Clearance - Ineffective  Goal: Achieve or maintain patent airway  8/2/2022 1819 by Javier Young  Outcome: Resolved/Met  8/2/2022 0815 by Javier Young  Outcome: Progressing Towards Goal     Problem: Gas Exchange - Impaired  Goal: Absence of hypoxia  8/2/2022 1819 by Javier Young  Outcome: Resolved/Met  8/2/2022 0815 by Javier Young  Outcome: Progressing Towards Goal  Goal: Promote optimal lung function  8/2/2022 1819 by Javier Young  Outcome: Resolved/Met  8/2/2022 0815 by Javier Young  Outcome: Progressing Towards Goal     Problem: Breathing Pattern - Ineffective  Goal: Ability to achieve and maintain a regular respiratory rate  8/2/2022 1819 by Javier Young  Outcome: Resolved/Met  8/2/2022 0815 by Javier Young  Outcome: Progressing Towards Goal     Problem:  Body Temperature -  Risk of, Imbalanced  Goal: Ability to maintain a body temperature within defined limits  8/2/2022 1819 by Javier Young  Outcome: Resolved/Met  8/2/2022 0815 by Javier Young  Outcome: Progressing Towards Goal  Goal: Will regain or maintain usual level of consciousness  8/2/2022 1819 by Javier Young  Outcome: Resolved/Met  8/2/2022 0815 by Javier Young  Outcome: Progressing Towards Goal  Goal: Complications related to the disease process, condition or treatment will be avoided or minimized  8/2/2022 1819 by Javier Young  Outcome: Resolved/Met  8/2/2022 0815 by Javier Young  Outcome: Progressing Towards Goal     Problem: Isolation Precautions - Risk of Spread of Infection  Goal: Prevent transmission of infectious organism to others  8/2/2022 1819 by Javier Young  Outcome: Resolved/Met  8/2/2022 0815 by Javier Young  Outcome: Progressing Towards Goal     Problem: Nutrition Deficits  Goal: Optimize nutrtional status  8/2/2022 1819 by Javier Young  Outcome: Resolved/Met  8/2/2022 9693 by Candida Parikh  Outcome: Progressing Towards Goal     Problem: Risk for Fluid Volume Deficit  Goal: Maintain normal heart rhythm  8/2/2022 1819 by Candida Parikh  Outcome: Resolved/Met  8/2/2022 0815 by Candida Parikh  Outcome: Progressing Towards Goal  Goal: Maintain absence of muscle cramping  8/2/2022 1819 by Candida Parikh  Outcome: Resolved/Met  8/2/2022 0815 by Candida Parikh  Outcome: Progressing Towards Goal  Goal: Maintain normal serum potassium, sodium, calcium, phosphorus, and pH  8/2/2022 1819 by Candida Parikh  Outcome: Resolved/Met  8/2/2022 0815 by Candida Parikh  Outcome: Progressing Towards Goal     Problem: Loneliness or Risk for Loneliness  Goal: Demonstrate positive use of time alone when socialization is not possible  8/2/2022 1819 by Candida Parikh  Outcome: Resolved/Met  8/2/2022 0815 by Candida Parikh  Outcome: Progressing Towards Goal     Problem: Fatigue  Goal: Verbalize increase energy and improved vitality  8/2/2022 1819 by Candida Parikh  Outcome: Resolved/Met  8/2/2022 0815 by Candida Parikh  Outcome: Progressing Towards Goal     Problem: Patient Education: Go to Patient Education Activity  Goal: Patient/Family Education  8/2/2022 1819 by Candida Parikh  Outcome: Resolved/Met  8/2/2022 0815 by Candida Parikh  Outcome: Progressing Towards Goal     Problem: Falls - Risk of  Goal: *Absence of Falls  Description: Document [place] Flow Fall Risk and appropriate interventions in the flowsheet.   8/2/2022 1819 by Candida Parikh  Outcome: Resolved/Met  8/2/2022 0815 by Candida Parikh  Outcome: Progressing Towards Goal  Note: Fall Risk Interventions:                      History of Falls Interventions: Bed/chair exit alarm         Problem: Patient Education: Go to Patient Education Activity  Goal: Patient/Family Education  8/2/2022 1819 by Candida Parikh  Outcome: Resolved/Met  8/2/2022 0815 by Candida Parikh  Outcome: Progressing Towards Goal     Problem: Nutrition Deficit  Goal: *Optimize nutritional status  Outcome: Resolved/Met

## 2022-08-02 NOTE — PROGRESS NOTES
Comprehensive Nutrition Assessment    Type and Reason for Visit: Initial    Nutrition Recommendations/Plan:   Low Na 2G Diet  Ensure enlive TID no Vanilla  Orange juice TID  Peaches and applesauce with meals. Malnutrition Assessment:  Malnutrition Status: At risk for malnutrition (specify) (reduced appetite) (08/02/22 7618)    Context:  Acute illness     Findings of the 6 clinical characteristics of malnutrition:   Energy Intake:  Mild decrease in energy intake (specify) (decreased appetite)  Weight Loss:  No significant weight loss     Body Fat Loss:  Unable to assess,     Muscle Mass Loss:  Unable to assess,    Fluid Accumulation:  Unable to assess,     Strength:  Not performed     Nutrition Assessment:    72 yo female PMH: HTN    Nutrition Related Findings:    Normal weight BMI 19.9 at 127 lbs. Pt admitted after syncope and collapse while shopping. Rapid COVID came back positive as well as incidental finding of lung mass. Pt reports weakness, fatigue and dry cough. Currently waiting for transfer to [facility] to be seen by oncology. Denies any unintended wt loss but reports does not like taste of food here currently on Low Na diet. Discussed food preferences over the phone pt would like peaches, applesauce, orange juice on her trays and will take ensure enlive for protien source. Wound Type: None    Current Nutrition Intake & Therapies:  Average Meal Intake: 1-25%  Average Supplement Intake: None ordered  ADULT DIET Regular; Low Sodium (2 gm)    Anthropometric Measures:  Height: 5' 7\" (170.2 cm)  Ideal Body Weight (IBW): 135 lbs (61 kg)  Admission Body Weight: 127 lb  Current Body Wt:  57.6 kg (127 lb), 94.1 % IBW. Bed scale  Current BMI (kg/m2): 19.9        Weight Adjustment: No adjustment                 BMI Category: Normal weight (BMI 18.5-24. 9)    Estimated Daily Nutrient Needs:  Energy Requirements Based On: Kcal/kg (25-30 kcal/kg)  Weight Used for Energy Requirements: Admission (58 kg)  Energy (kcal/day): 6630-1154 kcal/day  Weight Used for Protein Requirements: Admission (1-1.2 g/kg)  Protein (g/day): 58-70 g/day  Method Used for Fluid Requirements: 1 ml/kcal  Fluid (ml/day): 2443-4514 ml/day    Nutrition Diagnosis:   Inadequate oral intake, Increased nutrient needs related to catabolic illness, altered taste perception as evidenced by intake 0-25%    Nutrition Interventions:   Food and/or Nutrient Delivery: Continue current diet, Start oral nutrition supplement  Nutrition Education/Counseling: Education not appropriate  Coordination of Nutrition Care: Continue to monitor while inpatient       Goals:     Goals: PO intake 75% or greater, by next RD assessment       Nutrition Monitoring and Evaluation:      Food/Nutrient Intake Outcomes: Food and nutrient intake, Supplement intake  Physical Signs/Symptoms Outcomes: Weight, Meal time behavior    Follow up 8/7/2022    Discharge Planning:    Continue current diet, Continue oral nutrition supplement    Bg Hatfield  Contact: BETTY [phone]

## 2022-08-02 NOTE — DISCHARGE SUMMARY
[facility] Hospitalist Discharge Summary     Patient ID:    Enzo Zayas  [id number]  20 y.o.  1955    Admit date: 7/31/2022    Discharge date : 8/2/2022    Chronic Diagnoses:    Problem List as of 8/2/2022 Never Reviewed            Codes Class Noted - Resolved    Lung mass ICD-10-CM: R91.8  ICD-9-CM: 786.6  7/31/2022 - Present        COVID-19 ICD-10-CM: U07.1  ICD-9-CM: 079.89  7/31/2022 - Present        * (Principal) Syncope and collapse ICD-10-CM: R55  ICD-9-CM: 780.2  7/31/2022 - Present       22    Final Diagnoses:   Principal Problem:    Syncope and collapse (7/31/2022)    Active Problems:    Lung mass (7/31/2022)      COVID-19 (7/31/2022)        Reason for Hospitalization:  Enzo Zayas is a 73 y.o. -American female with a past medical history for Hypertension, presents to the ED with a chief complaint of syncope and collapse. On arrival to ED patient was found to be COVID-19 positive, but patient was asymptomatic on room air, also patient was found to have a large suprahilar right upper lobe mass measuring 8.3 cm, almost certainly representing primary malignancy. Additional metastatic right upper lobe pulmonary nodules. 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 At that time ED physician reached out to the transfer center, and patient is awaiting a bed at [facility] to have a follow-up with oncology, currently there are no beds at this time. While patient was inpatient she was hypokalemia, hypomagnesemia, and hypertensive, at this time patient was started on a low-dose of Norvasc 5 mg daily, and her electrolytes was replaced. Patient was scheduled to be discharged dependent on a follow-up with pulmonology, which was made for August 9, 2022, and her electrolytes response to replacement.  Patient labs are pending, but patient is adamant about leaving before her labs status, her daughter is her ride home and is waiting for the patient downstairs. Patient is clinically sober, free of distracting insight, appears to have intact judgment, insight, and reason. My view this patient can make informed consent. This writer instructed the patient to return to the hospital if symptoms return to complete treatment. Explained to patient that he takes the risks of permanent disability and even death if he leaves [address] Patient was in carriage to follow-up with his PCP as soon as possible for follow-up appointment. Prescriptions sent in for the patient. Syncope and collapse  -received IV hydration  -orthostatic BP negative  -ECHO: Normal left ventricular systolic function. EF by 2D [employer] Biplane is 61%. Left ventricle size is normal. Mildly increased wall thickness. See diagram for wall motion findings. Abnormal diastolic function. Mild  -CT head negative for acute intracranial abnormality      Lung mass  -patient has a follow-up with Dr. Omaira Willis scheduled for 8/9/2022  -CT Chest: there is a large suprahilar right upper lobe mass measuring 8.3 cm, almost certainly representing primary malignancy. Additional metastatic right upper lobe pulmonary nodules. 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  Metastatic mediastinal and right hilar lymphadenopathy  -CXR: Large perihilar right upper lobe mass, highly suspicious for primary malignancy     COVID-19  -confirmed on 7/31/22  -CXR: Large perihilar right upper lobe mass, highly suspicious for primary malignancy  -supplemental O2 as needed, keep O2 saturation >92%, currently stable on room air  -Afebrile, PRN tylenol for fever    Hypokalemia  -k 3.1, patient received Potassium 40mEq x2 today, repeat BMP in am if still here,  -stat repeat pending    Hypomagnesemia  -magnesium 1.4, patient received 2 grams of IV magnesium  -magnesium level pending    Anemia of Chronic disease  -Possibly related to CA, iron panel does not correlate with iron deficiency  -H/H : 7.5/24.9  -patient recommended to follow-up with her PCP     Hypertension  -chronic, uncontrolled  -patient started on Norvasc, prescription sent to [facility]    Total Time Spent: 25 minutes  Discharge Medications:   Current Discharge Medication List        START taking these medications    Details   amLODIPine (NORVASC) 5 mg tablet Take 1 Tablet by mouth in the morning. Qty: 60 Tablet, Refills: 0  Start date: 8/3/2022      potassium chloride (K-DUR, KLOR-CON M20) 20 mEq tablet Take 1 Tablet by mouth in the morning for 7 days. Qty: 7 Tablet, Refills: 0  Start date: 8/3/2022, End date: 8/10/2022             Follow up Care:    1. None in 1-2 weeks. Follow-up Information       Follow up With Specialties Details Why Contact Info    Sis Mathur MD Pulmonary Disease Follow up on 8/9/2022 @ [address] [place] [address] [place]/ [facility]  [phone]                Patient Follow Up Instructions: Activity: Activity as tolerated  Diet:  Cardiac Diet    Condition at Discharge:  Stable  __________________________________________________________________    Disposition    ____________________________________________________________________    Code Status:  Full Code  ___________________________________________________________________    Discharge Exam:  Patient seen and examined by me on discharge day. Pertinent Findings:  Gen:    Not in distress  Chest: Clear lungs  CVS:   Regular rhythm.   No edema  Abd:  Soft, not distended, not tender  Neuro:  Alert    CONSULTATIONS: None    Significant Diagnostic Studies:   Recent Results (from the past 24 hour(s))   METABOLIC PANEL, BASIC    Collection Time: 08/02/22  5:05 AM   Result Value Ref Range    Sodium 142 136 - 145 mmol/L    Potassium 3.1 (L) 3.5 - 5.5 mmol/L    Chloride 111 100 - 111 mmol/L    CO2 24 21 - 32 mmol/L    Anion gap 7 3.0 - 18.0 mmol/L    Glucose 86 74 - 99 mg/dL    BUN 5 (L) 7 - 18 mg/dL    Creatinine 0.53 (L) 0.60 - 1.30 mg/dL    BUN/Creatinine ratio 9 (L) 12 - 20      GFR est AA >60 >60 ml/min/1.73m2    GFR est non-AA >60 >60 ml/min/1.73m2    Calcium 7.8 (L) 8.5 - 10.1 mg/dL   CBC WITH MANUAL DIFF    Collection Time: 08/02/22  5:05 AM   Result Value Ref Range    WBC 7.5 4.6 - 13.2 K/uL    RBC 2.81 (L) 4.20 - 5.30 M/uL    HGB 7.5 (L) 12.0 - 16.0 g/dL    HCT 24.9 (L) 35.0 - 45.0 %    MCV 88.6 78.0 - 100.0 FL    MCH 26.7 24.0 - 34.0 PG    MCHC 30.1 (L) 31.0 - 37.0 g/dL    RDW 16.4 (H) 11.6 - 14.5 %    PLATELET 896 614 - 741 K/uL    MPV 11.8 9.2 - 11.8 FL    NRBC 0.0 0.0  WBC    ABSOLUTE NRBC 0.00 0.00 - 0.01 K/uL    NEUTROPHILS 82 (H) 40 - 73 %    BAND NEUTROPHILS 0 0 - 5 %    LYMPHOCYTES 9 (L) 21 - 52 %    MONOCYTES 7 3 - 10 %    EOSINOPHILS 2 0 - 5 %    BASOPHILS 0 0 - 2 %    METAMYELOCYTES 0 0 %    MYELOCYTES 0 0 %    PROMYELOCYTES 0 0 %    BLASTS 0 0 %    OTHER CELL 0 %    IMMATURE GRANULOCYTES 0 %    ABS. NEUTROPHILS 6.1 1.8 - 8.0 K/UL    ABS. LYMPHOCYTES 0.7 (L) 0.9 - 3.6 K/UL    ABS. MONOCYTES 0.5 0.05 - 1.2 K/UL    ABS. EOSINOPHILS 0.2 0.0 - 0.4 K/UL    ABS. BASOPHILS 0.0 0.0 - 0.1 K/UL    ABS. IMM. GRANS. 0.0 K/UL    DF Manual      RBC COMMENTS Normocytic, Normochromic      DIFFERENTIAL Manual Differenctial Ordered     MAGNESIUM    Collection Time: 08/02/22  5:05 AM   Result Value Ref Range    Magnesium 1.4 (L) 1.6 - 2.6 mg/dL   POTASSIUM    Collection Time: 08/02/22  5:35 PM   Result Value Ref Range    Potassium 3.1 (L) 3.5 - 5.5 mmol/L   MAGNESIUM    Collection Time: 08/02/22  5:35 PM   Result Value Ref Range    Magnesium 1.9 1.6 - 2.6 mg/dL     CT HEAD WO CONT   Final Result      No CT evidence of an acute intracranial abnormality. CTA CHEST W OR W WO CONT   Final Result      1.  Correlating with abnormality on prior chest radiograph, there is a large   suprahilar right upper lobe mass measuring 8.3 cm, almost certainly representing   primary malignancy. Additional metastatic right upper lobe pulmonary nodules. Interstitial thickening and nodularity throughout the right upper lobe likely   represents lymphangitic carcinomatosis. Metastatic mediastinal and right hilar   lymphadenopathy. Primary right upper lobe mass would be amenable to either   CT-guided core biopsy or tissue sampling via bronchoscopy. 2. No evidence of pulmonary thromboembolic disease. XR CHEST PORT   Final Result      Large perihilar right upper lobe mass, highly suspicious for primary malignancy.         Signed:  ALEN Valenzuela  8/2/2022  6:29 PM

## 2022-08-06 LAB
BACTERIA SPEC CULT: NORMAL
BACTERIA SPEC CULT: NORMAL
SPECIAL REQUESTS,SREQ: NORMAL
SPECIAL REQUESTS,SREQ: NORMAL

## 2022-09-24 ENCOUNTER — HOSPITAL ENCOUNTER (EMERGENCY)
Age: 67
Discharge: ANOTHER HOSPITAL | End: 2022-09-24
Attending: EMERGENCY MEDICINE
Payer: MEDICARE

## 2022-09-24 ENCOUNTER — APPOINTMENT (OUTPATIENT)
Dept: CT IMAGING | Age: 67
End: 2022-09-24
Attending: EMERGENCY MEDICINE
Payer: MEDICARE

## 2022-09-24 VITALS
OXYGEN SATURATION: 99 % | TEMPERATURE: 98.3 F | RESPIRATION RATE: 20 BRPM | BODY MASS INDEX: 19.62 KG/M2 | WEIGHT: 125 LBS | HEART RATE: 130 BPM | SYSTOLIC BLOOD PRESSURE: 175 MMHG | DIASTOLIC BLOOD PRESSURE: 75 MMHG | HEIGHT: 67 IN

## 2022-09-24 DIAGNOSIS — J96.01 ACUTE RESPIRATORY FAILURE WITH HYPOXIA (HCC): ICD-10-CM

## 2022-09-24 DIAGNOSIS — A41.9 SEPSIS, DUE TO UNSPECIFIED ORGANISM, UNSPECIFIED WHETHER ACUTE ORGAN DYSFUNCTION PRESENT (HCC): Primary | ICD-10-CM

## 2022-09-24 DIAGNOSIS — M54.42 ACUTE LEFT-SIDED LOW BACK PAIN WITH LEFT-SIDED SCIATICA: ICD-10-CM

## 2022-09-24 DIAGNOSIS — R91.8 LUNG MASS: ICD-10-CM

## 2022-09-24 LAB
ALBUMIN SERPL-MCNC: 2.7 G/DL (ref 3.4–5)
ALBUMIN/GLOB SERPL: 0.5 {RATIO} (ref 0.8–1.7)
ALP SERPL-CCNC: 73 U/L (ref 45–117)
ALT SERPL-CCNC: 9 U/L (ref 13–56)
ANION GAP SERPL CALC-SCNC: 11 MMOL/L (ref 3–18)
APPEARANCE UR: CLEAR
AST SERPL W P-5'-P-CCNC: 5 U/L (ref 10–38)
BACTERIA URNS QL MICRO: NEGATIVE /HPF
BASOPHILS # BLD: 0 K/UL (ref 0–0.1)
BASOPHILS NFR BLD: 0 % (ref 0–2)
BILIRUB DIRECT SERPL-MCNC: 0.1 MG/DL (ref 0–0.2)
BILIRUB SERPL-MCNC: 0.5 MG/DL (ref 0.2–1)
BILIRUB UR QL: NEGATIVE
BUN SERPL-MCNC: 12 MG/DL (ref 7–18)
BUN/CREAT SERPL: 18 (ref 12–20)
CA-I BLD-MCNC: 9.1 MG/DL (ref 8.5–10.1)
CHLORIDE SERPL-SCNC: 106 MMOL/L (ref 100–111)
CO2 SERPL-SCNC: 24 MMOL/L (ref 21–32)
COLOR UR: YELLOW
CREAT SERPL-MCNC: 0.67 MG/DL (ref 0.6–1.3)
DIFFERENTIAL METHOD BLD: ABNORMAL
EOSINOPHIL # BLD: 0 K/UL (ref 0–0.4)
EOSINOPHIL NFR BLD: 0 % (ref 0–5)
EPITH CASTS URNS QL MICRO: ABNORMAL /LPF (ref 0–20)
ERYTHROCYTE [DISTWIDTH] IN BLOOD BY AUTOMATED COUNT: 16.7 % (ref 11.6–14.5)
ERYTHROCYTE [SEDIMENTATION RATE] IN BLOOD: 128 MM/HR
GLOBULIN SER CALC-MCNC: 5.4 G/DL (ref 2–4)
GLUCOSE SERPL-MCNC: 145 MG/DL (ref 74–99)
GLUCOSE UR STRIP.AUTO-MCNC: NEGATIVE MG/DL
HCT VFR BLD AUTO: 30.8 % (ref 35–45)
HGB BLD-MCNC: 9.2 G/DL (ref 12–16)
HGB UR QL STRIP: NEGATIVE
IMM GRANULOCYTES # BLD AUTO: 0 K/UL
IMM GRANULOCYTES NFR BLD AUTO: 0 %
KETONES UR QL STRIP.AUTO: ABNORMAL MG/DL
LACTATE SERPL-SCNC: 1.9 MMOL/L (ref 0.4–2)
LEUKOCYTE ESTERASE UR QL STRIP.AUTO: ABNORMAL
LYMPHOCYTES # BLD: 1.3 K/UL (ref 0.9–3.6)
LYMPHOCYTES NFR BLD: 5 % (ref 21–52)
MAGNESIUM SERPL-MCNC: 1.8 MG/DL (ref 1.6–2.6)
MCH RBC QN AUTO: 26.7 PG (ref 24–34)
MCHC RBC AUTO-ENTMCNC: 29.9 G/DL (ref 31–37)
MCV RBC AUTO: 89.3 FL (ref 78–100)
MONOCYTES # BLD: 0 K/UL (ref 0.05–1.2)
MONOCYTES NFR BLD: 0 % (ref 3–10)
NEUTS SEG # BLD: 24 K/UL (ref 1.8–8)
NEUTS SEG NFR BLD: 95 % (ref 40–73)
NITRITE UR QL STRIP.AUTO: NEGATIVE
NRBC # BLD: 0 K/UL (ref 0–0.01)
NRBC BLD-RTO: 0 PER 100 WBC
PH UR STRIP: 7 [PH] (ref 5–8)
PLATELET # BLD AUTO: 513 K/UL (ref 135–420)
PMV BLD AUTO: 10.2 FL (ref 9.2–11.8)
POTASSIUM SERPL-SCNC: 3.1 MMOL/L (ref 3.5–5.5)
PROCALCITONIN SERPL-MCNC: 0.14 NG/ML
PROT SERPL-MCNC: 8.1 G/DL (ref 6.4–8.2)
PROT UR STRIP-MCNC: NEGATIVE MG/DL
RBC # BLD AUTO: 3.45 M/UL (ref 4.2–5.3)
RBC #/AREA URNS HPF: ABNORMAL /HPF (ref 0–2)
RBC MORPH BLD: ABNORMAL
SODIUM SERPL-SCNC: 141 MMOL/L (ref 136–145)
SP GR UR REFRACTOMETRY: 1.02 (ref 1–1.03)
UROBILINOGEN UR QL STRIP.AUTO: 1 EU/DL (ref 0.2–1)
WBC # BLD AUTO: 25.3 K/UL (ref 4.6–13.2)
WBC URNS QL MICRO: ABNORMAL /HPF (ref 0–4)

## 2022-09-24 PROCEDURE — 86141 C-REACTIVE PROTEIN HS: CPT

## 2022-09-24 PROCEDURE — 96367 TX/PROPH/DG ADDL SEQ IV INF: CPT

## 2022-09-24 PROCEDURE — 81001 URINALYSIS AUTO W/SCOPE: CPT

## 2022-09-24 PROCEDURE — 87086 URINE CULTURE/COLONY COUNT: CPT

## 2022-09-24 PROCEDURE — 80048 BASIC METABOLIC PNL TOTAL CA: CPT

## 2022-09-24 PROCEDURE — 74011250636 HC RX REV CODE- 250/636: Performed by: EMERGENCY MEDICINE

## 2022-09-24 PROCEDURE — 84145 PROCALCITONIN (PCT): CPT

## 2022-09-24 PROCEDURE — 96375 TX/PRO/DX INJ NEW DRUG ADDON: CPT

## 2022-09-24 PROCEDURE — 74011250637 HC RX REV CODE- 250/637: Performed by: EMERGENCY MEDICINE

## 2022-09-24 PROCEDURE — 96361 HYDRATE IV INFUSION ADD-ON: CPT

## 2022-09-24 PROCEDURE — 83735 ASSAY OF MAGNESIUM: CPT

## 2022-09-24 PROCEDURE — 80076 HEPATIC FUNCTION PANEL: CPT

## 2022-09-24 PROCEDURE — 36415 COLL VENOUS BLD VENIPUNCTURE: CPT

## 2022-09-24 PROCEDURE — 71260 CT THORAX DX C+: CPT

## 2022-09-24 PROCEDURE — 85025 COMPLETE CBC W/AUTO DIFF WBC: CPT

## 2022-09-24 PROCEDURE — 85651 RBC SED RATE NONAUTOMATED: CPT

## 2022-09-24 PROCEDURE — 99285 EMERGENCY DEPT VISIT HI MDM: CPT

## 2022-09-24 PROCEDURE — 93005 ELECTROCARDIOGRAM TRACING: CPT

## 2022-09-24 PROCEDURE — 96365 THER/PROPH/DIAG IV INF INIT: CPT

## 2022-09-24 PROCEDURE — 74011000258 HC RX REV CODE- 258: Performed by: EMERGENCY MEDICINE

## 2022-09-24 PROCEDURE — 74011000636 HC RX REV CODE- 636: Performed by: EMERGENCY MEDICINE

## 2022-09-24 PROCEDURE — 87040 BLOOD CULTURE FOR BACTERIA: CPT

## 2022-09-24 PROCEDURE — 83605 ASSAY OF LACTIC ACID: CPT

## 2022-09-24 RX ORDER — MORPHINE SULFATE 4 MG/ML
4 INJECTION, SOLUTION INTRAMUSCULAR; INTRAVENOUS ONCE
Status: COMPLETED | OUTPATIENT
Start: 2022-09-24 | End: 2022-09-24

## 2022-09-24 RX ORDER — ONDANSETRON 2 MG/ML
4 INJECTION INTRAMUSCULAR; INTRAVENOUS ONCE
Status: COMPLETED | OUTPATIENT
Start: 2022-09-24 | End: 2022-09-24

## 2022-09-24 RX ORDER — POTASSIUM CHLORIDE 750 MG/1
40 TABLET, EXTENDED RELEASE ORAL
Status: COMPLETED | OUTPATIENT
Start: 2022-09-24 | End: 2022-09-24

## 2022-09-24 RX ORDER — HYDROMORPHONE HYDROCHLORIDE 1 MG/ML
0.5 INJECTION, SOLUTION INTRAMUSCULAR; INTRAVENOUS; SUBCUTANEOUS ONCE
Status: COMPLETED | OUTPATIENT
Start: 2022-09-24 | End: 2022-09-24

## 2022-09-24 RX ADMIN — VANCOMYCIN HYDROCHLORIDE 1000 MG: 1 INJECTION, POWDER, LYOPHILIZED, FOR SOLUTION INTRAVENOUS at 08:22

## 2022-09-24 RX ADMIN — POTASSIUM CHLORIDE 40 MEQ: 750 TABLET, EXTENDED RELEASE ORAL at 08:31

## 2022-09-24 RX ADMIN — MORPHINE SULFATE 4 MG: 4 INJECTION, SOLUTION INTRAMUSCULAR; INTRAVENOUS at 06:29

## 2022-09-24 RX ADMIN — IOPAMIDOL 100 ML: 755 INJECTION, SOLUTION INTRAVENOUS at 07:36

## 2022-09-24 RX ADMIN — SODIUM CHLORIDE, POTASSIUM CHLORIDE, SODIUM LACTATE AND CALCIUM CHLORIDE 1000 ML: 600; 310; 30; 20 INJECTION, SOLUTION INTRAVENOUS at 06:29

## 2022-09-24 RX ADMIN — HYDROMORPHONE HYDROCHLORIDE 0.5 MG: 1 INJECTION, SOLUTION INTRAMUSCULAR; INTRAVENOUS; SUBCUTANEOUS at 09:38

## 2022-09-24 RX ADMIN — PIPERACILLIN AND TAZOBACTAM 3.38 G: 3; .375 INJECTION, POWDER, FOR SOLUTION INTRAVENOUS at 07:44

## 2022-09-24 RX ADMIN — ONDANSETRON 4 MG: 2 INJECTION INTRAMUSCULAR; INTRAVENOUS at 06:29

## 2022-09-24 NOTE — ED TRIAGE NOTES
Pt states she has had L lower back pain for 1 week and starting yesterday has been unable to walk. PT states she has not been eating or drinking much this week. Pt points to her pain which is located in L lower lumbar/ sacral area.

## 2022-09-24 NOTE — ED PROVIDER NOTES
EMERGENCY DEPARTMENT HISTORY AND PHYSICAL EXAM      Date: 9/24/2022  Patient Name: Chloé Luna    History of Presenting Illness     Chief Complaint   Patient presents with    Back Pain       History Provided By: Patient, EMS    HPI: Chloé Luna, 78 y.o. female with history of hypertension, lung mass presenting with back pain. Onset was 1 week ago. It has been constant. Severe. Located in the left lower back and midline lumbosacral region. It is severe, 10 out of 10. Radiates down the left leg. Worse with attempted ambulation or standing. Patient has been bedbound over the the last day because of this. She has not been eating or drinking well because of the degree of pain. She denies any significant fall or injury. She has not had any urinary symptoms and denies bowel or bladder incontinence. She denies any loss of sensation or weakness in her legs. She does report some constipation but is passing gas, denies nausea or vomiting. She has not had any fever, chills, chest pain, shortness of breath, she denies alcohol or drug use. 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 She also had multiple electrolyte abnormalities at that time. She ended up leaving [address] She was supposed to have had a follow-up appointment with pulmonology but did not go to this appointment. There are no other complaints, changes, or physical findings at this time. PCP: None    Current Outpatient Medications   Medication Sig Dispense Refill    amLODIPine (NORVASC) 5 mg tablet Take 1 Tablet by mouth in the morning. 60 Tablet 0       Past History   Past Medical History:  Past Medical History:   Diagnosis Date    Hypertension        Past Surgical History:  History reviewed. No pertinent surgical history.     Family History:  History reviewed. No pertinent family history. Social History:  Social History     Tobacco Use    Smoking status: Former     Packs/day: 0.25     Types: Cigarettes    Smokeless tobacco: Never   Substance Use Topics    Alcohol use: Not Currently    Drug use: Not Currently       Allergies:  No Known Allergies  Review of Systems   Review of Systems   Constitutional:  Negative for fever. HENT:  Negative for rhinorrhea. Respiratory:  Negative for shortness of breath. Cardiovascular:  Negative for chest pain. Gastrointestinal:  Positive for constipation. Negative for abdominal pain, nausea and vomiting. Genitourinary:  Negative for decreased urine volume. Musculoskeletal:  Positive for back pain. Skin:  Negative for wound. Allergic/Immunologic: Negative for immunocompromised state. Neurological:  Negative for weakness and numbness. Hematological:  Does not bruise/bleed easily. Psychiatric/Behavioral:  Negative for confusion. Physical Exam   Physical Exam  Vitals and nursing note reviewed. Constitutional:       Appearance: Normal appearance. HENT:      Head: Normocephalic and atraumatic. Mouth/Throat:      Mouth: Mucous membranes are dry. Eyes:      Conjunctiva/sclera: Conjunctivae normal.   Cardiovascular:      Rate and Rhythm: Regular rhythm. Tachycardia present. Pulmonary:      Effort: Pulmonary effort is normal. No respiratory distress. Breath sounds: Wheezing present. Comments: Decreased R lung sounds w basilar crackles and expiratory wheeze  Abdominal:      General: There is no distension. Palpations: Abdomen is soft. Tenderness: There is no abdominal tenderness. There is no left CVA tenderness or guarding. Musculoskeletal:         General: Tenderness (lumbosacral spine diffusely) present. No deformity. Skin:     General: Skin is warm and dry. Neurological:      Mental Status: She is alert and oriented to person, place, and time. Cranial Nerves: No cranial nerve deficit. Comments: Pt reporting severe pain with movement of left leg, attempted straight leg test but unable to complete with immediate severe pain with movement at ankle, able to flex at hip and knee but limited ROM due to pain. Psychiatric:         Mood and Affect: Mood normal.         Behavior: Behavior normal.       Lab and Diagnostic Study Results   Labs -     Recent Results (from the past 12 hour(s))   CBC WITH AUTOMATED DIFF    Collection Time: 09/24/22  6:20 AM   Result Value Ref Range    WBC 25.3 (H) 4.6 - 13.2 K/uL    RBC 3.45 (L) 4.20 - 5.30 M/uL    HGB 9.2 (L) 12.0 - 16.0 g/dL    HCT 30.8 (L) 35.0 - 45.0 %    MCV 89.3 78.0 - 100.0 FL    MCH 26.7 24.0 - 34.0 PG    MCHC 29.9 (L) 31.0 - 37.0 g/dL    RDW 16.7 (H) 11.6 - 14.5 %    PLATELET 779 (H) 369 - 420 K/uL    MPV 10.2 9.2 - 11.8 FL    NRBC 0.0 0.0  WBC    ABSOLUTE NRBC 0.00 0.00 - 0.01 K/uL    NEUTROPHILS 95 (H) 40 - 73 %    LYMPHOCYTES 5 (L) 21 - 52 %    MONOCYTES 0 (L) 3 - 10 %    EOSINOPHILS 0 0 - 5 %    BASOPHILS 0 0 - 2 %    IMMATURE GRANULOCYTES 0 %    ABS. NEUTROPHILS 24.0 (H) 1.8 - 8.0 K/UL    ABS. LYMPHOCYTES 1.3 0.9 - 3.6 K/UL    ABS. MONOCYTES 0.0 (L) 0.05 - 1.2 K/UL    ABS. EOSINOPHILS 0.0 0.0 - 0.4 K/UL    ABS. BASOPHILS 0.0 0.0 - 0.1 K/UL    ABS. IMM.  GRANS. 0.0 K/UL    DF AUTOMATED      RBC COMMENTS Normocytic, Normochromic     METABOLIC PANEL, BASIC    Collection Time: 09/24/22  6:20 AM   Result Value Ref Range    Sodium 141 136 - 145 mmol/L    Potassium 3.1 (L) 3.5 - 5.5 mmol/L    Chloride 106 100 - 111 mmol/L    CO2 24 21 - 32 mmol/L    Anion gap 11 3.0 - 18.0 mmol/L    Glucose 145 (H) 74 - 99 mg/dL    BUN 12 7 - 18 mg/dL    Creatinine 0.67 0.60 - 1.30 mg/dL    BUN/Creatinine ratio 18 12 - 20      GFR est AA >60 >60 ml/min/1.73m2    GFR est non-AA >60 >60 ml/min/1.73m2    Calcium 9.1 8.5 - 10.1 mg/dL   HEPATIC FUNCTION PANEL    Collection Time: 09/24/22  6:20 AM   Result Value Ref Range Protein, total 8.1 6.4 - 8.2 g/dL    Albumin 2.7 (L) 3.4 - 5.0 g/dL    Globulin 5.4 (H) 2.0 - 4.0 g/dL    A-G Ratio 0.5 (L) 0.8 - 1.7      Bilirubin, total 0.5 0.2 - 1.0 mg/dL    Bilirubin, direct 0.1 0.0 - 0.2 mg/dL    Alk. phosphatase 73 45 - 117 U/L    AST (SGOT) 5 (L) 10 - 38 U/L    ALT (SGPT) 9 (L) 13 - 56 U/L   LACTIC ACID    Collection Time: 09/24/22  6:20 AM   Result Value Ref Range    Lactic acid 1.9 0.4 - 2.0 mmol/L   MAGNESIUM    Collection Time: 09/24/22  6:20 AM   Result Value Ref Range    Magnesium 1.8 1.6 - 2.6 mg/dL   PROCALCITONIN    Collection Time: 09/24/22  6:20 AM   Result Value Ref Range    Procalcitonin 0.14 ng/mL   SED RATE (ESR)    Collection Time: 09/24/22  6:20 AM   Result Value Ref Range    Sed rate (ESR) 128 mm/hr   URINALYSIS W/ RFLX MICROSCOPIC    Collection Time: 09/24/22  7:55 AM   Result Value Ref Range    Color Yellow      Appearance Clear      Specific gravity 1.017 1.005 - 1.030      pH (UA) 7.0 5.0 - 8.0      Protein Negative Negative mg/dL    Glucose Negative Negative mg/dL    Ketone Trace (A) Negative mg/dL    Bilirubin Negative Negative      Blood Negative Negative      Urobilinogen 1.0 0.2 - 1.0 EU/dL    Nitrites Negative Negative      Leukocyte Esterase Trace (A) Negative     URINE MICROSCOPIC    Collection Time: 09/24/22  7:55 AM   Result Value Ref Range    WBC 0-4 0 - 4 /hpf    RBC 0-5 0 - 2 /hpf    Epithelial cells Moderate 0 - 20 /lpf    Bacteria Negative (A) None /hpf       Radiologic Studies -   [unfilled]  CT Results  (Last 48 hours)                 09/24/22 0745  CT CHEST ABD PELV W CONT Final result    Impression:      1. Severe emphysema with interval increase in size of the large right upper   lobe/perihilar mass (9.9 x 8.9 x 10.9 cm), likely centrally necrotic, with   additional centrally necrotic metastatic mediastinal and right hilar   lymphadenopathy.  Mass obstructs the right upper lobe bronchus and there is   either debris or mass invading the right mainstem bronchus and bronchus   intermedius. 2. Peripheral areas of atelectasis/airspace disease versus satellite mass within   the right middle lobe. 3. No findings highly suspicious for metastatic disease within the   abdomen/pelvis. 4. Benign interosseous hemangioma at L3 with slight vertebral body height loss. There may be very mild surrounding edema at this level with possible acute   superior endplate fracture. This could be further evaluated with MR lumbar   spine, if clinically necessary. Additionally, there is lower lumbar hypertrophic   degenerative facet arthropathy and degenerative disc disease which could also   serve as etiology for the patient's lower extremity radicular symptomatology. 5. 6.9 x 8.3 cm simple appearing cystic structure within the midline pelvis,   possibly arising from the right adnexa. While simple in appearance, given the   size, recommend gynecological follow-up for management. Narrative:  EXAM: CT of the chest, abdomen, and pelvis       CLINICAL INDICATION/HISTORY: Left lower back pain and radicular symptoms     > Additional: None. COMPARISON: CTA chest 07/31/2022       TECHNIQUE: Axial CT imaging of the chest, abdomen, and pelvis was performed with   intravenous contrast.  Multiplanar reformats were generated. One or more dose   reduction techniques were used on this CT: automated exposure control,   adjustment of the mAs and/or kVp according to patient size, and iterative   reconstruction techniques. The specific techniques used on this CT exam have   been documented in the patient's electronic medical record. Digital Imaging and Communications in Medicine (DICOM) format image data are   available to nonaffiliated external healthcare facilities or entities on a   secure, media free, reciprocally searchable basis with patient authorization for   at least a 12 month period after this study.        _______________       FINDINGS: CHEST:       BASE OF NECK: Normal.       MEDIASTINUM: Three vessel aortic arch. Great vessels unremarkable. Normal   heart size. No pericardial effusion. LYMPH NODES: Mediastinal lymphadenopathy. Some nodes demonstrate areas of   central hypodensity consistent with necrosis. The largest is a right lower   paratracheal lymph node measuring 2.9 x 2.3 cm. There is also right hilar and   subcarinal lymphadenopathy. The right hilar lymphadenopathy is indistinguishable   from the right hilar mass. AIRWAY: Mass and/or debris invades the right mainstem bronchus and bronchus   intermedius. The right upper lobe bronchus is obstructed by the mass. LUNGS: Large centrally hypodense right perihilar mass measuring approximately   9.9 x 8.9 x 10.9 cm. Within the periphery of the right upper lobe is a   satellite mass measuring 2.1 x 3.2 cm in size. Some redemonstrated focal opacity   within the right middle lobe measuring 1.5 x 2.7 cm in size. There are areas of   dependent atelectasis within both lower lobes. Findings occur on a background of   severe emphysema. PLEURA: Normal.       BONES: No acute or aggressive osseous abnormalities identified. OTHER:  None.       ===============       ABDOMEN/PELVIS:       LIVER, BILIARY: Benign simple cyst within the right and left hepatic lobes. No   suspicious liver mass. No biliary dilation. Cholelithiasis without otherwise   normal-appearing gallbladder. PANCREAS: Pancreatic ductal dilation. No discrete pancreatic mass. SPLEEN: Normal.       ADRENALS: Normal.       KIDNEYS/URETERS/BLADDER: Kidneys enhance symmetrically. There are left-sided   benign simple cysts which require no dedicated imaging follow-up. There are   other bilateral subcentimeter cortical-based low attenuating lesions which are   too small to characterize but typically benign. The bladder is unremarkable.        PELVIC ORGANS: Posterior to the uterus is a 6.9 x 8.3 cm simple appearing cystic   structure. This may arise from the right adnexa. VASCULATURE: Atherosclerosis. LYMPH NODES: No enlarged lymph nodes. GASTROINTESTINAL TRACT: No bowel dilation or wall thickening. Normal appendix. BONES: Sclerotic focus within the right anterior acetabulum and inferior left   parasymphyseal pubic bone. These may represent small benign bone islands. There   is a benign intraosseous hemangioma at L3 with very slight vertebral body height   loss. No aggressive appearing lytic lesion involving the spine. There is   hypertrophic lower lumbar degenerative facet arthropathy. There is degenerative   disc disease at L5-S1. Areas of bilateral neural foraminal narrowing at L4-L5   and L5-S1.       OTHER: None.       _______________                 CXR Results  (Last 48 hours)      None            Medical Decision Making and ED Course   Differential Diagnosis & Medical Decision Making Provider Note:   Patient presenting with generalized weakness and back pain. Consider sepsis, bacteremia, worsening malignancy, metastatic disease, discitis, epidural abscess, retroperitoneal infection or obstruction or bleeding, others    - I am the first and primary provider for this patient. I reviewed the vital signs, available nursing notes, past medical history, past surgical history, family history and social history. The patient's presenting problems have been discussed, and the staff are in agreement with the care plan formulated and outlined with them. I have encouraged them to ask questions as they arise throughout their visit. Vital Signs-Reviewed the patient's vital signs.   Patient Vitals for the past 12 hrs:   Temp Pulse Resp BP SpO2   09/24/22 0940 -- (!) 118 -- (!) 160/88 95 %   09/24/22 0746 -- (!) 110 -- (!) 175/83 97 %   09/24/22 0637 -- (!) 105 20 (!) 172/92 98 %   09/24/22 0607 98.3 °F (36.8 °C) (!) 130 22 (!) 155/79 98 %       EKG interpretation: (Preliminary): EKG Interpreted by me. Shows sinus tachycardia, rate 127, , QTc 450, borderline diffuse ST depression, no acute ST segment elevation, multiple PVCs, compared to previous PVCs are new otherwise fairly similar    ED Course:      60-year-old female who was recently diagnosed with a large right lung mass likely malignancy but lost to follow-up since presenting due to generalized weakness and left-sided back pain, atraumatic. Upon arrival noted that she appeared with dry mucous membranes, frail, tachycardic to 130s although afebrile. Concern for recurrent electrolyte abnormality, septic process, metastatic spread including to spine. Septic work-up initiated, when initial labs resulted with leukocytosis of 25 proceed with vancomycin and Zosyn. Will obtain CT chest abdomen and pelvis    Results as above. Primarily concerned for this patient is sepsis due to postobstructive pneumonia and her worsening evidence of malignancy. Lower suspicion for any invasive CNS process in the spine however cannot rule out entirely. Her pain seems similar to sciatica and not demonstrating clear deficits. She is not focally tender as would be expected for epidural abscess or discitis and clinically low risk for this. However will need admission to facility with pulmonology, oncology, likely MRI capability due to issues with pain and difficulty ambulating. Updated patient and daughter, they initially request [facility] vs daughter asking about [facility]    After fluids, pain control, patient did become slightly hypoxemic, on 2 to 4 L    10:31 AM  Have spoken to Dr. Anita Hastings at [facility] with hospital service. He does not feel comfortable excepting patient prior to having an MRI done showing that there is not a acute neurosurgical emergency as they do not have neurosurgery available. Discussed with transfer center potential for either ED to ED transfer to obtain MRI or direct to facility with neurosurgery available if needed.   Within [facility] system this would mean over 24-hour likely wait for an available bed. 10:50 AM   Have spoken with [facility] hospitalist Dr Donald Monsalve who states patient needs interventional pulm that is not available at [facility] or other facility with this service. Will discuss for potential ED to ED transfer w [facility] per discussion previously with Dr Sera Silverio as MRI is not primary factor to consider with no neuro deficits    11:00 AM  Spoke with Dr Daisy Bledsoe at [facility] ED who kindly accepted to ER for MRI and likely admit. Transfer center to facilitate transport         Procedures and Critical Care     Performed by: Sarbjit Prajapati MD  Procedures      CRITICAL CARE NOTE :    6:39 AM    IMPENDING DETERIORATION -Respiratory, Metabolic, and Renal  ASSOCIATED RISK FACTORS - Hypoxia, Metabolic changes, and Dehydration  MANAGEMENT- Bedside Assessment and Transfer  INTERPRETATION -  CT Scan  INTERVENTIONS - Metobolic interventions and antibiotics and pain control  CASE REVIEW - Hospitalist/Intensivist, Nursing, and Family  TREATMENT RESPONSE -Improved  PERFORMED BY - Self    NOTES   :  I have spent 120 minutes of critical care time involved in lab review, consultations with specialist, family decision- making, bedside attention and documentation. This time excludes time spent in any separate billed procedures. During this entire length of time I was immediately available to the patient . Sarbjit Prajapati MD    Disposition   Disposition: Transferred to  [facility]  patient verbally agreed to transfer and understand the risks involved as outlined in the EMTALA form. Diagnosis/Clinical Impression     Clinical Impression:   1. Sepsis, due to unspecified organism, unspecified whether acute organ dysfunction present ([address]    2. Acute respiratory failure with hypoxia (HCC)    3. Acute left-sided low back pain with left-sided sciatica    4. Lung mass        Attestations: Gordy Goldsmith MD, am the primary clinician of record. Please note that this dictation was completed with [employer], the computer voice recognition software. Quite often unanticipated grammatical, syntax, homophones, and other interpretive errors are inadvertently transcribed by the computer software. Please disregard these errors. Please excuse any errors that have escaped final proofreading. Thank you.

## 2022-09-25 LAB — CRP SERPL HS-MCNC: >9.5 MG/L

## 2022-09-26 LAB
ATRIAL RATE: 129 BPM
BACTERIA SPEC CULT: NORMAL
CALCULATED P AXIS, ECG09: 80 DEGREES
CALCULATED R AXIS, ECG10: 68 DEGREES
CALCULATED T AXIS, ECG11: 57 DEGREES
COLONY COUNT,CNT: NORMAL
DIAGNOSIS, 93000: NORMAL
P-R INTERVAL, ECG05: 118 MS
Q-T INTERVAL, ECG07: 309 MS
QRS DURATION, ECG06: 76 MS
QTC CALCULATION (BEZET), ECG08: 450 MS
SPECIAL REQUESTS,SREQ: NORMAL
VENTRICULAR RATE, ECG03: 127 BPM

## 2022-10-16 ENCOUNTER — APPOINTMENT (OUTPATIENT)
Dept: CT IMAGING | Age: 67
End: 2022-10-16
Attending: EMERGENCY MEDICINE
Payer: MEDICARE

## 2022-10-16 ENCOUNTER — HOSPITAL ENCOUNTER (EMERGENCY)
Age: 67
Discharge: HOME | End: 2022-10-16
Attending: EMERGENCY MEDICINE
Payer: MEDICARE

## 2022-10-16 VITALS
WEIGHT: 110 LBS | DIASTOLIC BLOOD PRESSURE: 72 MMHG | BODY MASS INDEX: 17.27 KG/M2 | RESPIRATION RATE: 16 BRPM | OXYGEN SATURATION: 99 % | TEMPERATURE: 97.3 F | HEART RATE: 95 BPM | SYSTOLIC BLOOD PRESSURE: 139 MMHG | HEIGHT: 67 IN

## 2022-10-16 DIAGNOSIS — R91.8 LUNG MASS: Primary | ICD-10-CM

## 2022-10-16 DIAGNOSIS — R04.2 COUGH WITH HEMOPTYSIS: ICD-10-CM

## 2022-10-16 LAB
ABO + RH BLD: NORMAL
ALBUMIN SERPL-MCNC: 2.6 G/DL (ref 3.4–5)
ALBUMIN/GLOB SERPL: 0.5 {RATIO} (ref 0.8–1.7)
ALP SERPL-CCNC: 78 U/L (ref 45–117)
ALT SERPL-CCNC: 9 U/L (ref 13–56)
ANION GAP SERPL CALC-SCNC: 9 MMOL/L (ref 3–18)
AST SERPL W P-5'-P-CCNC: 7 U/L (ref 10–38)
BASOPHILS # BLD: 0 K/UL (ref 0–0.1)
BASOPHILS NFR BLD: 0 % (ref 0–2)
BILIRUB SERPL-MCNC: 0.5 MG/DL (ref 0.2–1)
BLOOD GROUP ANTIBODIES SERPL: NEGATIVE
BNP SERPL-MCNC: 156 PG/ML (ref 0–900)
BUN SERPL-MCNC: 9 MG/DL (ref 7–18)
BUN/CREAT SERPL: 16 (ref 12–20)
CA-I BLD-MCNC: 9.1 MG/DL (ref 8.5–10.1)
CHLORIDE SERPL-SCNC: 102 MMOL/L (ref 100–111)
CO2 SERPL-SCNC: 28 MMOL/L (ref 21–32)
CREAT SERPL-MCNC: 0.57 MG/DL (ref 0.6–1.3)
DIFFERENTIAL METHOD BLD: ABNORMAL
EOSINOPHIL # BLD: 0 K/UL (ref 0–0.4)
EOSINOPHIL NFR BLD: 0 % (ref 0–5)
ERYTHROCYTE [DISTWIDTH] IN BLOOD BY AUTOMATED COUNT: 16.5 % (ref 11.6–14.5)
GLOBULIN SER CALC-MCNC: 5.3 G/DL (ref 2–4)
GLUCOSE SERPL-MCNC: 104 MG/DL (ref 74–99)
HCT VFR BLD AUTO: 29.9 % (ref 35–45)
HGB BLD-MCNC: 8.8 G/DL (ref 12–16)
IMM GRANULOCYTES # BLD AUTO: 0 K/UL
IMM GRANULOCYTES NFR BLD AUTO: 0 %
INR PPP: 1.1 (ref 0.8–1.2)
LYMPHOCYTES # BLD: 1.2 K/UL (ref 0.9–3.6)
LYMPHOCYTES NFR BLD: 5 % (ref 21–52)
MCH RBC QN AUTO: 26.4 PG (ref 24–34)
MCHC RBC AUTO-ENTMCNC: 29.4 G/DL (ref 31–37)
MCV RBC AUTO: 89.8 FL (ref 78–100)
MONOCYTES # BLD: 1.2 K/UL (ref 0.05–1.2)
MONOCYTES NFR BLD: 5 % (ref 3–10)
NEUTS SEG # BLD: 21.3 K/UL (ref 1.8–8)
NEUTS SEG NFR BLD: 90 % (ref 40–73)
NRBC # BLD: 0 K/UL (ref 0–0.01)
NRBC BLD-RTO: 0 PER 100 WBC
PLATELET # BLD AUTO: 455 K/UL (ref 135–420)
PMV BLD AUTO: 10.8 FL (ref 9.2–11.8)
POTASSIUM SERPL-SCNC: 3.3 MMOL/L (ref 3.5–5.5)
PROT SERPL-MCNC: 7.9 G/DL (ref 6.4–8.2)
PROTHROMBIN TIME: 14.2 SEC (ref 11.5–15.2)
RBC # BLD AUTO: 3.33 M/UL (ref 4.2–5.3)
RBC MORPH BLD: ABNORMAL
SODIUM SERPL-SCNC: 139 MMOL/L (ref 136–145)
SPECIMEN EXP DATE BLD: NORMAL
TROPONIN-HIGH SENSITIVITY: 9 NG/L (ref 0–54)
WBC # BLD AUTO: 23.7 K/UL (ref 4.6–13.2)

## 2022-10-16 PROCEDURE — 99285 EMERGENCY DEPT VISIT HI MDM: CPT

## 2022-10-16 PROCEDURE — 83880 ASSAY OF NATRIURETIC PEPTIDE: CPT

## 2022-10-16 PROCEDURE — 84484 ASSAY OF TROPONIN QUANT: CPT

## 2022-10-16 PROCEDURE — 74011250637 HC RX REV CODE- 250/637: Performed by: EMERGENCY MEDICINE

## 2022-10-16 PROCEDURE — 93005 ELECTROCARDIOGRAM TRACING: CPT

## 2022-10-16 PROCEDURE — 86900 BLOOD TYPING SEROLOGIC ABO: CPT

## 2022-10-16 PROCEDURE — 80053 COMPREHEN METABOLIC PANEL: CPT

## 2022-10-16 PROCEDURE — 85025 COMPLETE CBC W/AUTO DIFF WBC: CPT

## 2022-10-16 PROCEDURE — 74011000636 HC RX REV CODE- 636: Performed by: EMERGENCY MEDICINE

## 2022-10-16 PROCEDURE — 85610 PROTHROMBIN TIME: CPT

## 2022-10-16 PROCEDURE — 71275 CT ANGIOGRAPHY CHEST: CPT

## 2022-10-16 RX ORDER — AZITHROMYCIN 250 MG/1
TABLET, FILM COATED ORAL
Qty: 6 TABLET | Refills: 0 | Status: SHIPPED | OUTPATIENT
Start: 2022-10-16 | End: 2022-10-21

## 2022-10-16 RX ORDER — LEVOFLOXACIN 250 MG/1
750 TABLET ORAL
Status: COMPLETED | OUTPATIENT
Start: 2022-10-16 | End: 2022-10-16

## 2022-10-16 RX ORDER — TRAMADOL HYDROCHLORIDE 50 MG/1
50 TABLET ORAL
COMMUNITY
Start: 2022-09-29

## 2022-10-16 RX ADMIN — LEVOFLOXACIN 750 MG: 250 TABLET, FILM COATED ORAL at 13:20

## 2022-10-16 RX ADMIN — IOPAMIDOL 100 ML: 755 INJECTION, SOLUTION INTRAVENOUS at 10:30

## 2022-10-16 NOTE — ED NOTES
ED tech attempted IV access/blood draw x 3, unsuccessful. MD aware, U/S moved to pt's room.   Lab notified as well for possible straight stick

## 2022-10-16 NOTE — ED TRIAGE NOTES
Pt states she woke up around 5 am and \"got to coughing\"  states she coughed up a little bloody mucus. Pt states she was told by someone that she might could have pneumonia. Pt denies any shortness of breath, denies chest pain, denies fevers. Pt on continuous O2 via NC.   Pt is unsure what her lung hx is, states \"yall sent me to [facility] for a mass in my lung\"

## 2022-10-17 LAB
ATRIAL RATE: 97 BPM
CALCULATED P AXIS, ECG09: 72 DEGREES
CALCULATED R AXIS, ECG10: 47 DEGREES
CALCULATED T AXIS, ECG11: 35 DEGREES
DIAGNOSIS, 93000: NORMAL
P-R INTERVAL, ECG05: 123 MS
Q-T INTERVAL, ECG07: 317 MS
QRS DURATION, ECG06: 83 MS
QTC CALCULATION (BEZET), ECG08: 403 MS
VENTRICULAR RATE, ECG03: 97 BPM

## 2022-10-17 NOTE — ED PROVIDER NOTES
EMERGENCY DEPARTMENT HISTORY AND PHYSICAL EXAM      Date: 10/16/2022  Patient Name: Jose Nuñez    History of Presenting Illness     Chief Complaint   Patient presents with    Cough       History Provided By: Patient and Patient's Daughter    HPI: Jose Nñuez, 78 y.o. female w recent diagnosis of lung mass presenting with hemoptysis. Chart review reveals large necrotic RUL lesion, recent treatment for postobstructive pneumonia. . She had bronchoscopy which showed adenocarcinoma and metastatic disease. Was discahrged several weeks. Ago. She missed a pulmonolgy appointment on 10/11 but states she was not aware she had an appointment that day. She states she has noted some specks of blood off and on but that today noted two ~dime sized clots which brought her into the ED. She otherwise feels her typical self. There are no other complaints, changes, or physical findings at this time. PCP: None    Current Outpatient Medications   Medication Sig Dispense Refill    traMADoL (ULTRAM) 50 mg tablet Take 50 mg by mouth three (3) times daily as needed. azithromycin (ZITHROMAX) 250 mg tablet Take 2 tabs day 1 take 1 tab days 2-5 6 Tablet 0    amLODIPine (NORVASC) 5 mg tablet Take 1 Tablet by mouth in the morning. 60 Tablet 0       Past History   Past Medical History:  Past Medical History:   Diagnosis Date    Hypertension        Past Surgical History:  History reviewed. No pertinent surgical history. Family History:  No family history on file. Social History:  Social History     Tobacco Use    Smoking status: Former     Packs/day: 0.25     Types: Cigarettes    Smokeless tobacco: Never   Substance Use Topics    Alcohol use: Not Currently    Drug use: Not Currently       Allergies:  No Known Allergies  Review of Systems   Review of Systems   Constitutional:  Negative for appetite change, fatigue and fever. HENT:  Negative for congestion, ear pain, sinus pressure, sinus pain and sore throat. Eyes:  Negative for redness and visual disturbance. Respiratory:  Positive for cough (w hemotpysis) and shortness of breath (baseline). Negative for chest tightness and wheezing. Cardiovascular:  Negative for chest pain, palpitations and leg swelling. Gastrointestinal:  Negative for abdominal pain, diarrhea, nausea and vomiting. Genitourinary:  Negative for dysuria and flank pain. Musculoskeletal:  Negative for arthralgias, back pain, gait problem and myalgias. Skin:  Negative for rash. Neurological:  Negative for dizziness, speech difficulty, light-headedness, numbness and headaches. Psychiatric/Behavioral:  Negative for suicidal ideas. The patient is not nervous/anxious. Physical Exam   Physical Exam  Vitals and nursing note reviewed. Constitutional:       Appearance: Normal appearance. HENT:      Head: Normocephalic and atraumatic. Nose: Nose normal.      Mouth/Throat:      Mouth: Mucous membranes are moist.      Pharynx: No oropharyngeal exudate or posterior oropharyngeal erythema. Eyes:      Conjunctiva/sclera: Conjunctivae normal.   Cardiovascular:      Rate and Rhythm: Normal rate and regular rhythm. Pulses: Normal pulses. Heart sounds: Normal heart sounds. Pulmonary:      Effort: Pulmonary effort is normal.      Comments: Diminished right side lung sounds. Abdominal:      General: Abdomen is flat. Palpations: Abdomen is soft. Musculoskeletal:         General: Normal range of motion. Cervical back: Normal range of motion. Skin:     General: Skin is warm and dry. Coloration: Skin is not jaundiced or pale. Findings: No erythema. Neurological:      Mental Status: She is alert. Mental status is at baseline. Psychiatric:         Mood and Affect: Mood normal.         Thought Content:  Thought content normal.        Lab and Diagnostic Study Results   Labs -       Radiologic Studies -   [unfilled]  CT Results  (Last 48 hours)      None CXR Results  (Last 48 hours)      None            Medical Decision Making and ED Course   Differential Diagnosis & Medical Decision Making Provider Note:   Discussed patient's initial CT findings with patient and family at [facility]. They had not realized the extent of the disease. Due to concern fro erosion, CTA peroformed. Discussed results with patient and patient family  recommended admission. Discussed w Pulmonoloty on call who stated pt sholudl be admitted to help expedite radiation thearpy given the sensitivities of the lesion. Pt is not intersted in admission at this time. Discussed need to start treatment asap if that is what she wants. She states she will 'take care of things' at home and will return if she cannot followup soon. Discussed with Pulmonology group who saw her in the [facility]. Recommends OP treatment w abx if she is not willing to be admitted andto follow up with oncology to arrange therapy. Return precautions discussed. Pt has contact information for Oncology. - I am the first and primary provider for this patient. I reviewed the vital signs, available nursing notes, past medical history, past surgical history, family history and social history. The patient's presenting problems have been discussed, and the staff are in agreement with the care plan formulated and outlined with them. I have encouraged them to ask questions as they arise throughout their visit. Vital Signs-Reviewed the patient's vital signs. No data found. EKG interpretation: (Preliminary): EKG Interpreted by me. Showsno stemi. Not ecopy. ED Course:   ED Course as of 10/18/22 1303   Sun Oct 16, 2022   1053 WBC(!): 23.7  Leukocytosis noted. Improved from 9/24 admission   [MC]   1145 Pt very stable. Feels better. Waiting call back from pulmonology for care planning. [MC]   80 Spoke wi pulmonology on call Dr. Jesus Prajapati. Limited as unable to view imagign. Recommends admission.  Discussed with Patient who states she is unwilling to be admitted at this time. We dsicussed possib []      ED Course User Index  [] Jethro Devlin MD          Procedures and Critical Care     Performed by: Ila Morillo MD  Procedures       CRITICAL CARE NOTE :    7:18 PM    IMPENDING DETERIORATION -Airway, Respiratory, and Cardiovascular  ASSOCIATED RISK FACTORS - Hypotension, Hypoxia, Bleeding, Dysrhythmia, Metabolic changes, and Vascular Compromise  MANAGEMENT- Bedside Assessment, Supervision of Care, and Transfer  INTERPRETATION -  CT Scan, ECG, Blood Pressure, and Cardiac Output Measures   INTERVENTIONS - hemodynamic mngmt and vascular control  CASE REVIEW - Medical Sub-Specialist, Nursing, and Family  TREATMENT RESPONSE -Stable  PERFORMED BY - Self    NOTES   :  I have spent 20 minutes of critical care time involved in lab review, consultations with specialist, family decision- making, bedside attention and documentation. This time excludes time spent in any separate billed procedures. During this entire length of time I was immediately available to the patient . Ila Morillo MD    Disposition   Disposition: Condition stable  DC- Adult Discharges: All of the diagnostic tests were reviewed and questions answered. Diagnosis, care plan and treatment options were discussed. The patient understands the instructions and will follow up as directed. The patients results have been reviewed with them. They have been counseled regarding their diagnosis. The patient and family member patient and daughter verbally convey understanding and agreement of the signs, symptoms, diagnosis, treatment and prognosis and additionally agrees to follow up as recommended with their PCP in 24 - 48 hours. They also agree with the care-plan and convey that all of their questions have been answered.   I have also put together some discharge instructions for them that include: 1) educational information regarding their diagnosis, 2) how to care for their diagnosis at home, as well a 3) list of reasons why they would want to return to the ED prior to their follow-up appointment, should their condition change. DC-The patient and daughter was given verbal chest pain warning signs and, dehydration, and follow-up instructions  DC- Pain Control DC Home plan: Referral oncology, pulmonology    DISCHARGE PLAN:  1. Cannot display discharge medications since this patient is not currently admitted. 2.   Follow-up Information       Follow up With Specialties Details Why Contact [facility] EMERGENCY DEPT Emergency Medicine Go to  As needed, or for any concerns or deteriorations. , if symptoms persist or worsen. [address] Morales MD Pulmonary Disease Schedule an appointment as soon as possible for a visit       [facility] NURSE NAVIGATOR Case Management Call  For followup and recheck of todays symptoms [phone]    [facility] Oncology Call  For followup and recheck of todays symptoms [phone]          3. Return to ED if worse   4. Discharge Medication List as of 10/16/2022  1:17 PM        CONTINUE these medications which have NOT CHANGED    Details   traMADoL (ULTRAM) 50 mg tablet Take 50 mg by mouth three (3) times daily as needed., Historical Med      amLODIPine (NORVASC) 5 mg tablet Take 1 Tablet by mouth in the morning., Normal, Disp-60 Tablet, R-0              Diagnosis/Clinical Impression     Clinical Impression:   1. Lung mass    2. Cough with hemoptysis        Attestations: Francisco Cruz MD, am the primary clinician of record. Please note that this dictation was completed with [employer], the [employer] voice recognition software. Quite often unanticipated grammatical, syntax, homophones, and other interpretive errors are inadvertently transcribed by the computer software. Please disregard these errors. Please excuse any errors that have escaped final proofreading. Thank you.

## 2022-11-09 NOTE — ED PROVIDER NOTES
EMERGENCY DEPARTMENT HISTORY AND PHYSICAL EXAM      Date: 11/9/2022  Patient Name: Jorge Cottrell    History of Presenting Illness     Chief Complaint   Patient presents with    Hip Pain       History Provided By: Patient    HPI: Jorge Cottrell, 78 y.o. female you have hypertension that was found to have a right upper lobe mass /hemoptysis on 7/31/22 and was transferred to [facility] where she had a biopsy. She was made DNR/DNI and was supposed to fu with Dr Karina Barragan and is to have an appt with her in Dec to determine treatment options. She was brought by her daughter to the ER today due to the patient having pain all over her body; diarrhea x 3 today; feeling week; speech slurred. Denies SOB; chest pain; fever/chills; no dysuria or hemoptysis. PCP: Dr Riana Beckwith. There are no other complaints, changes, or physical findings at this time. PCP: None    Current Facility-Administered Medications   Medication Dose Route Frequency Provider Last Rate Last Admin    sodium chloride (NS) flush 5-10 mL  5-10 mL IntraVENous PRN Lakshmi Watts MD        vancomycin (VANCOCIN) 1,000 mg in 0.9% sodium chloride 250 mL ([id number])  1,000 mg IntraVENous ONCE Mino Katz MD        sodium chloride 0.9 % bolus infusion 890 mL  890 mL IntraVENous ONCE Mino Katz MD        piperacillin-tazobactam (ZOSYN) 4.5 g in 0.9% sodium chloride (MBP/ADV) 100 mL MBP  4.5 g IntraVENous NOW Mino Katz MD        levoFLOXacin (LEVAQUIN) 750 mg in D5W IVPB  750 mg IntraVENous ONCE Mino Katz  mL/hr at 11/09/22 1820 750 mg at 11/09/22 1820     Current Outpatient Medications   Medication Sig Dispense Refill    traMADoL (ULTRAM) 50 mg tablet Take 50 mg by mouth three (3) times daily as needed. amLODIPine (NORVASC) 5 mg tablet Take 1 Tablet by mouth in the morning.  60 Tablet 0       Past History   Past Medical History:  Past Medical History:   Diagnosis Date    Hypertension     Lung cancer [facility])        Past Surgical History:  Past Surgical History:   Procedure Laterality Date    HX BUNIONECTOMY         Family History:  History reviewed. No pertinent family history. Social History:  Social History     Tobacco Use    Smoking status: Former     Packs/day: 0.25     Types: Cigarettes    Smokeless tobacco: Never   Substance Use Topics    Alcohol use: Not Currently    Drug use: Not Currently       Allergies:  No Known Allergies  Review of Systems   Review of Systems   Constitutional:  Positive for appetite change and fatigue. Negative for chills and fever. HENT:  Negative for trouble swallowing. Eyes:  Positive for visual disturbance. Respiratory:  Negative for cough, choking and shortness of breath. Cardiovascular:  Negative for chest pain. Gastrointestinal:  Positive for diarrhea. Negative for abdominal pain, nausea and vomiting. Genitourinary:  Negative for dysuria, flank pain and urgency. Musculoskeletal:  Positive for arthralgias and myalgias. Skin:  Negative for rash. Neurological:  Positive for weakness. Negative for headaches. Psychiatric/Behavioral:  Negative for confusion. Physical Exam   Physical Exam  Vitals and nursing note reviewed. Constitutional:       Comments: Thin frail female; lethargic but speaking coherently   HENT:      Head: Normocephalic. Mouth/Throat:      Pharynx: Oropharynx is clear. Eyes:      Extraocular Movements: Extraocular movements intact. Conjunctiva/sclera: Conjunctivae normal.   Cardiovascular:      Rate and Rhythm: Regular rhythm. Tachycardia present. Pulses: Normal pulses. Heart sounds: Normal heart sounds. No murmur heard. Pulmonary:      Effort: Pulmonary effort is normal.      Breath sounds: Normal breath sounds. Comments: Decreased breath sounds right lung; no resp distress. Abdominal:      General: Abdomen is flat. Bowel sounds are normal.      Tenderness: There is no abdominal tenderness.  There is no guarding. Musculoskeletal:      Cervical back: Neck supple. Right lower leg: No edema. Left lower leg: No edema. Skin:     General: Skin is warm and dry. Neurological:      Mental Status: She is oriented to person, place, and time. Psychiatric:         Behavior: Behavior normal.       Lab and Diagnostic Study Results   Labs -     Recent Results (from the past 12 hour(s))   CBC WITH AUTOMATED DIFF    Collection Time: 11/09/22  3:04 PM   Result Value Ref Range    WBC 53.0 (HH) 4.6 - 13.2 K/uL    RBC 2.79 (L) 4.20 - 5.30 M/uL    HGB 7.4 (L) 12.0 - 16.0 g/dL    HCT 26.0 (L) 35.0 - 45.0 %    MCV 93.2 78.0 - 100.0 FL    MCH 26.5 24.0 - 34.0 PG    MCHC 28.5 (L) 31.0 - 37.0 g/dL    RDW 18.2 (H) 11.6 - 14.5 %    PLATELET 607 715 - 510 K/uL    MPV 12.7 (H) 9.2 - 11.8 FL    NRBC 0.0 0.0  WBC    ABSOLUTE NRBC 0.02 (H) 0.00 - 0.01 K/uL    NEUTROPHILS 62 40 - 73 %    BAND NEUTROPHILS 6 (H) 0 - 5 %    LYMPHOCYTES 6 (L) 21 - 52 %    MONOCYTES 15 (H) 3 - 10 %    EOSINOPHILS 11 (H) 0 - 5 %    BASOPHILS 0 0 - 2 %    IMMATURE GRANULOCYTES 0 %    ABS. NEUTROPHILS 36.0 (H) 1.8 - 8.0 K/UL    ABS. LYMPHOCYTES 3.2 0.9 - 3.6 K/UL    ABS. MONOCYTES 8.0 (H) 0.05 - 1.2 K/UL    ABS. EOSINOPHILS 5.8 (H) 0.0 - 0.4 K/UL    ABS. BASOPHILS 0.0 0.0 - 0.1 K/UL    ABS. IMM.  GRANS. 0.0 K/UL    DF Manual      PLATELET COMMENTS VARIATION IN SIZE     RBC COMMENTS Anisocytosis  1+        RBC COMMENTS Hypochromia  1+        RBC COMMENTS Polychromasia  MODERATE  Poikilocytosis  2+       EKG, 12 LEAD, INITIAL    Collection Time: 11/09/22  3:50 PM   Result Value Ref Range    Ventricular Rate 129 BPM    Atrial Rate 129 BPM    P-R Interval 110 ms    QRS Duration 71 ms    Q-T Interval 316 ms    QTC Calculation (Bezet) 463 ms    Calculated P Axis 67 degrees    Calculated R Axis 62 degrees    Calculated T Axis -87 degrees    Diagnosis       Sinus tachycardia  Probable left atrial enlargement  Nonspecific repol abnormality, diffuse leads    Confirmed by Shweta Joyce ([id number]) on 11/9/2022 5:15:55 PM     TROPONIN-HIGH SENSITIVITY    Collection Time: 11/09/22  4:20 PM   Result Value Ref Range    Troponin-High Sensitivity 9 0 - 54 ng/L   LACTIC ACID    Collection Time: 11/09/22  4:20 PM   Result Value Ref Range    Lactic acid 2.5 (HH) 0.4 - 2.0 mmol/L   LIPASE    Collection Time: 11/09/22  4:20 PM   Result Value Ref Range    Lipase 40 (L) 73 - 393 U/L   MAGNESIUM    Collection Time: 11/09/22  4:20 PM   Result Value Ref Range    Magnesium 1.9 1.6 - 2.6 mg/dL   METABOLIC PANEL, COMPREHENSIVE    Collection Time: 11/09/22  4:20 PM   Result Value Ref Range    Sodium 140 136 - 145 mmol/L    Potassium 3.4 (L) 3.5 - 5.5 mmol/L    Chloride 104 100 - 111 mmol/L    CO2 27 21 - 32 mmol/L    Anion gap 9 3.0 - 18.0 mmol/L    Glucose 134 (H) 74 - 99 mg/dL    BUN 18 7 - 18 mg/dL    Creatinine 0.45 (L) 0.60 - 1.30 mg/dL    BUN/Creatinine ratio 40 (H) 12 - 20      eGFR >60 >60 ml/min/1.73m2    Calcium 8.4 (L) 8.5 - 10.1 mg/dL    Bilirubin, total 0.6 0.2 - 1.0 mg/dL    AST (SGOT) 7 (L) 10 - 38 U/L    ALT (SGPT) 8 (L) 13 - 56 U/L    Alk.  phosphatase 75 45 - 117 U/L    Protein, total 7.1 6.4 - 8.2 g/dL    Albumin 2.0 (L) 3.4 - 5.0 g/dL    Globulin 5.1 (H) 2.0 - 4.0 g/dL    A-G Ratio 0.4 (L) 0.8 - 1.7     CULTURE, BLOOD    Collection Time: 11/09/22  5:15 PM    Specimen: Blood   Result Value Ref Range    Special Requests: No Special Requests      Culture result: PENDING    LACTIC ACID    Collection Time: 11/09/22  5:15 PM   Result Value Ref Range    Lactic acid 2.9 (HH) 0.4 - 2.0 mmol/L   PROCALCITONIN    Collection Time: 11/09/22  5:15 PM   Result Value Ref Range    Procalcitonin 0.18 ng/mL   CULTURE, BLOOD    Collection Time: 11/09/22  5:25 PM    Specimen: Blood   Result Value Ref Range    Special Requests: No Special Requests      Culture result: PENDING        Radiologic Studies -   [unfilled]  CT Results  (Last 48 hours)      None          CXR Results  (Last 48 hours)                 11/09/22 1557  XR CHEST PORT Final result    Impression:          1. Subtotal opacification of the right hemithorax likely a combination of known   large right lung mass, atelectasis, and accompanying pleural effusion. Narrative:  EXAM: XR CHEST PORT       CLINICAL INDICATION/HISTORY: LUNG CANCER   -Additional: None       COMPARISON: Radiographs July 31, 2022; CT angiogram of the chest 10/16/2022       TECHNIQUE: Frontal view of the chest       _______________       FINDINGS:       HEART AND MEDIASTINUM: Normal cardiac size. Mediastinal contours are stable as   visualized, partially obscured       LUNGS AND PLEURAL SPACES: Subtotal opacification of the right hemithorax. No   pneumothorax. Blunted right costophrenic angle. Left lung clear as visualized. BONY THORAX AND SOFT TISSUES: No acute osseous abnormality       _______________                   Medical Decision Making and ED Course   Differential Diagnosis & Medical Decision Making Provider Note:   Malignancy related complication: infectious; metabolic; renal failure; sepsis    - I am the first and primary provider for this patient. I reviewed the vital signs, available nursing notes, past medical history, past surgical history, family history and social history. The patient's presenting problems have been discussed, and the staff are in agreement with the care plan formulated and outlined with them. I have encouraged them to ask questions as they arise throughout their visit. Vital Signs-Reviewed the patient's vital signs. Patient Vitals for the past 12 hrs:   Temp Pulse Resp BP SpO2   11/09/22 1902 -- (!) 124 20 118/76 94 %   11/09/22 1822 98.6 °F (37 °C) (!) 128 20 (!) 141/86 93 %   11/09/22 1720 -- (!) 130 21 136/72 94 %   11/09/22 1648 -- (!) 124 20 136/72 95 %   11/09/22 1448 97.3 °F (36.3 °C) 72 20 136/81 100 %       EKG interpretation: (Preliminary): EKG Interpreted by me. 1550Shows tachycardia 129/min.   Normal AL, normal QRS, normal axis, normal QTC, nonspecific ST changes with mild ST depression lateral leads V4 to V6. No acute ST segment changes. ED Course:    6:33 PM  Case discussed with Hospitalist Dr Nora Galicia who accepted pt in transfer. Family states that the want the pt to receive treatement by oncology  Procedures and Critical Care     6:31 PM  I have spent 30 minutes of critical care time involved in lab review, consultations with specialist, family decision-making, and documentation. During this entire length of time I was immediately available to the patient. Critical Care: The reason for providing this level of medical care for this critically ill patient was due a critical illness that impaired one or more vital organ systems such that there was a high probability of imminent or life threatening deterioration in the patients condition. This care involved high complexity decision making to assess, manipulate, and support vital system functions, to treat this degreee vital organ system failure and to prevent further life threatening deterioration of the patients condition. Disposition   Disposition: Transfer to [facility]      Diagnosis/Clinical Impression     Clinical Impression:   1. Sepsis without acute organ dysfunction, due to unspecified organism ([address]    2. Pneumonia of right lung due to infectious organism, unspecified part of lung        Attestations: IKaz MD, am the primary clinician of record. Please note that this dictation was completed with [employer], the computer voice recognition software. Quite often unanticipated grammatical, syntax, homophones, and other interpretive errors are inadvertently transcribed by the computer software. Please disregard these errors. Please excuse any errors that have escaped final proofreading. Thank you.

## 2022-11-09 NOTE — ED TRIAGE NOTES
Seen at lung specialist on Wednesday, was checked, bp was low, xray done for leg pain, not heard test results yet    Hx lung cancer  Patient states she lives at home alone, states her speech slurred 2-3 days ago. States the pain brought her in the ED today, due to left hip pain that goes from left hip to right hip. Patient states she has lung cancer, and came her to get transferred to her lung specialist due to family not being able to get patient in the vehicle.      Diarrhea started today x 3     Lives at home with daughter visiting

## 2022-11-20 PROBLEM — R69 TERMINAL ILLNESS: Status: ACTIVE | Noted: 2022-01-01

## 2022-11-21 NOTE — PROGRESS NOTES
Admission Medication Reconciliation:    Information obtained from:  Comfort Measures    Comments/Recommendations: Reviewed PTA medications and patient's allergies. Patient on comfort measures. Meds removed from list  Amlodipine 5mg daily and Tramadol 50mg TID prn         Allergies:  Patient has no known allergies.     Significant PMH/Disease States:   Past Medical History:   Diagnosis Date    Hypertension     Lung cancer [facility])      Chief Complaint for this Admission:    Chief Complaint   Patient presents with    Other     Prior to Admission Medications:   None       CHRISTIAN Salinas

## 2022-11-21 NOTE — PROGRESS NOTES
0100-Pt reposition in bed. 0258-Pt reposition, increase in air hunger and discomfort, PRN morphine administer, will continue to monitor.     0500-Pt given IL

## 2022-11-21 NOTE — PROGRESS NOTES
Care Management Interventions  PCP Verified by CM: Yes (no recent visits to review.)  Mode of Transport at Discharge: BLS  Transition of Care Consult (CM Consult): Discharge Planning  Physical Therapy Consult: Yes  Support Systems: Child(macy), Other Family Member(s)  Confirm Follow Up Transport: Family  The Plan for Transition of Care is Related to the Following Treatment Goals : Discharge planning with family and pt request to continue comfort care. The Patient and/or Patient Representative was Provided with a Choice of Provider and Agrees with the Discharge Plan?: Yes  Name of the Patient Representative Who was Provided with a Choice of Provider and Agrees with the Discharge Plan: Adriana Chandler, and daughterBekah Esters of Choice List was Provided with Basic Dialogue that Supports the Patient's Individualized Plan of Care/Goals, Treatment Preferences and Shares the Quality Data Associated with the Providers?: Yes  Discharge Location  Patient Expects to be Discharged to[de-identified] Home   Pt presented to the ED having been taken out of [facility] by family. Pt has a history of lung cancer with mets. At the time of this interview, she was poorly responsive to voice or tactile stimuli. Mrs. Child, daughter, stated that the pt would try to open her eyes to name call. Family is requesting comfort care with IV pain control for pain that rates 9-10 on as 10 scale. They did not feel that the pt's pain was being managed appropriately and described grimicing, moving to and fro in bed and crying. Pt started on IV pain control with family at bedside. CM following for adm and or needs.

## 2022-11-21 NOTE — PROGRESS NOTES
0700- care of the patient assumed, patient repositioned, brief dry and clean    0827- prn morphine administered, patient repositioned    1200- rounding on patient no needs at this time, family at bedside    [phone]- patient repositioned, prn morphine administered

## 2022-11-21 NOTE — ED TRIAGE NOTES
Pt arrived via ems from [facility]. Pt is on hospice. Per Ems pt is unresponsive and they were called for patient being in excruciating pain. Upon arrival to ER. Pt is awake, alert, and oriented to self only. Pt states she is in pain. Upon arrival pt blood pressure severely hypotensive and sats unreadable. Pt is on 2L NC at baseline.

## 2022-11-21 NOTE — PROGRESS NOTES
P.T. screen completed via chart review. Pt here for comfort care, skilled P.T. not indicated.    uL Monk, PT, DPT

## 2022-11-21 NOTE — H&P
HISTORY & PHYSICAL  Ina Smith MD, [address]         NAME: Eliza Valdovinos   :     MRN:  [id number]     Date/Time:  2022 1:33 AM    Patient PCP: None       Subjective:   CHIEF COMPLAINT: Pain in nursing home    HISTORY OF PRESENT ILLNESS:     Flakita Madrigal is a 78 y.o. female brought in by daughter from [address] after pain being out of control while patient is on hospice and comfort measures. Patient is alert and oriented x1 only and unable to provide appropriate ROS in HPI however states currently her pain is controlled. Per daughter in ED, patient was not managed appropriately by hospice while only on tramadol at [address]. Patient with significant PMH of metastatic Right lung cancer diagnosed in 2022 status post bronchoscopy and evaluation by oncology as well. Patient with complete white out of the right lung during that hospitalization and discharged to [address] for hospice per family. Patient's hospice status as well as DNR/DNI CODE STATUS confirmed with patient's daughter telephonically. Patient with low blood pressures in the ED noted. Past Medical History:   Diagnosis Date    Hypertension     Lung cancer [facility])         Past Surgical History:   Procedure Laterality Date    HX BUNIONECTOMY         Social History     Tobacco Use    Smoking status: Former     Packs/day: 0.25     Types: Cigarettes    Smokeless tobacco: Never   Substance Use Topics    Alcohol use: Not Currently        History reviewed. No pertinent family history. No Known Allergies     Prior to Admission medications    Medication Sig Start Date End Date Taking? Authorizing Provider   traMADoL (ULTRAM) 50 mg tablet Take 50 mg by mouth three (3) times daily as needed. 22   Other, MD Barbara   amLODIPine (NORVASC) 5 mg tablet Take 1 Tablet by mouth in the morning.  [facility]       REVIEW OF SYSTEMS:     I am not able to complete the review of systems because: The patient is intubated and sedated   x The patient has altered mental status due to his acute medical problems    The patient has baseline aphasia from prior stroke(s)    The patient has baseline dementia and is not reliable historian    The patient is in acute medical distress and unable to provide information           Total of 12 systems reviewed as follows:       POSITIVE= underlined text  Negative = text not underlined  General:  fever, chills, sweats, generalized weakness, weight loss/gain,      loss of appetite   Eyes:    blurred vision, eye pain, loss of vision, double vision  ENT:    rhinorrhea, pharyngitis   Respiratory:   cough, sputum production, SOB, LUCIA, wheezing, pleuritic pain   Cardiology:   chest pain, palpitations, orthopnea, PND, edema, syncope   Gastrointestinal:  abdominal pain , N/V, diarrhea, dysphagia, constipation, bleeding   Genitourinary:  frequency, urgency, dysuria, hematuria, incontinence   Muskuloskeletal :  arthralgia, myalgia, back pain  Hematology:  easy bruising, nose or gum bleeding, lymphadenopathy   Dermatological: rash, ulceration, pruritis, color change / jaundice  Endocrine:   hot flashes or polydipsia   Neurological:  headache, dizziness, confusion, focal weakness, paresthesia,     Speech difficulties, memory loss, gait difficulty  Psychological: Feelings of anxiety, depression, agitation    Objective:   VITALS:    Visit Vitals  BP (!) 77/59   Pulse (!) 116   Temp (!) 95.9 °F (35.5 °C)   Resp 26   Wt 49.9 kg (110 lb)   SpO2 100%   BMI 17.23 kg/m²         LAB DATA REVIEWED:    No results found for this or any previous visit (from the past 24 hour(s)). PHYSICAL EXAM:    General:    Alert, cooperative, no distress, appears stated age.      HEENT: Atraumatic, anicteric sclerae, pink conjunctivae     No oral ulcers, mucosa moist, throat clear, dentition fair  Neck:  Supple, symmetrical,  thyroid: non tender  Lungs:   Clear to auscultation bilaterally. No Wheezing or Rhonchi. No rales. Chest wall:  No tenderness  No Accessory muscle use. Heart:   Regular  rhythm,  No  murmur   No edema  Abdomen:   Soft, non-tender. Not distended. Bowel sounds normal  Extremities: No cyanosis. No clubbing,      Skin turgor normal, Capillary refill normal, Radial dial pulse 2+  Skin:     Not pale. Not Jaundiced  No rashes   Psych:  Good insight. Not depressed. Not anxious or agitated. Neurologic: EOMs intact. No facial asymmetry. No aphasia or slurred speech. Symmetrical strength, Sensation grossly intact. Alert and oriented X 4.      ______________________________________________________________________  Given the patient's current clinical presentation, I have a high level of concern for decompensation if discharged from the emergency department. Complex decision making was performed, which includes reviewing the patient's available past medical records, laboratory results, and x-ray films. My assessment of this patient's clinical condition and my plan of care is as follows. Assessment / Plan:    Metastatic right lung cancer  Patient on hospice in nursing home. Start p.o. Percocet and IV morphine for comfort measures. Continue on 2 L NC O2. DNR/DNI CODE STATUS  Confirmed per family/MPOA.     _______________________________________________________________________  Care Plan discussed with:    Comments   Patient x    Family  x    RN x    Care Manager                    Consultant:      _______________________________________________________________________  Expected  Disposition:   Home with Family    HH/PT/OT/RN    SNF/LTC x   ZARINA    ________________________________________________________________________  TOTAL TIME:  30 Minutes    Critical Care Provided     Minutes non procedure based      Comments    x Reviewed previous records   >50% of visit spent in counseling and coordination of care x Discussion with patient and/or family and questions answered       ________________________________________________________________________  Signed: King Cueva MD    Procedures: see electronic medical records for all procedures/Xrays and details which were not copied into this note but were reviewed prior to creation of Plan.

## 2022-11-21 NOTE — PROGRESS NOTES
Problem: Pressure Injury - Risk of  Goal: *Prevention of pressure injury  Description: Document Rex Scale and appropriate interventions in the flowsheet. Outcome: Progressing Towards Goal  Note: Pressure Injury Interventions:  Sensory Interventions: Assess changes in LOC, Check visual cues for pain, Float heels, Keep linens dry and wrinkle-free, Maintain/enhance activity level, Monitor skin under medical devices, Minimize linen layers, Turn and reposition approx. every two hours (pillows and wedges if needed), Use 30-degree side-lying position    Moisture Interventions: Absorbent underpads, Minimize layers, Moisture barrier    Activity Interventions: Pressure redistribution bed/mattress(bed type)    Mobility Interventions: Float heels, HOB 30 degrees or less, Turn and reposition approx. every two hours(pillow and wedges)    Nutrition Interventions: Document food/fluid/supplement intake, Offer support with meals,snacks and hydration    Friction and Shear Interventions: Apply protective barrier, creams and emollients, HOB 30 degrees or less, Minimize layers, Transferring/repositioning devices                Problem: Patient Education: Go to Patient Education Activity  Goal: Patient/Family Education  Outcome: Progressing Towards Goal     Problem: Falls - Risk of  Goal: *Absence of Falls  Description: Document Rosa M Fall Risk and appropriate interventions in the flowsheet.   Outcome: Progressing Towards Goal  Note: Fall Risk Interventions:       Mentation Interventions: Adequate sleep, hydration, pain control, Bed/chair exit alarm    Medication Interventions: Bed/chair exit alarm    Elimination Interventions: Bed/chair exit alarm              Problem: Patient Education: Go to Patient Education Activity  Goal: Patient/Family Education  Outcome: Progressing Towards Goal     Problem: Breathing Pattern - Ineffective  Goal: *Use of effective breathing techniques  Outcome: Progressing Towards Goal     Problem: Pain  Goal: *Control of acute pain  Outcome: Progressing Towards Goal     Problem: Pressure Injury - Risk of  Goal: *Prevention of pressure injury  Outcome: Progressing Towards Goal  Note: Pressure Injury Interventions:  Sensory Interventions: Assess changes in LOC, Check visual cues for pain, Float heels, Keep linens dry and wrinkle-free, Maintain/enhance activity level, Monitor skin under medical devices, Minimize linen layers, Turn and reposition approx. every two hours (pillows and wedges if needed), Use 30-degree side-lying position    Moisture Interventions: Absorbent underpads, Minimize layers, Moisture barrier    Activity Interventions: Pressure redistribution bed/mattress(bed type)    Mobility Interventions: Float heels, HOB 30 degrees or less, Turn and reposition approx.  every two hours(pillow and wedges)    Nutrition Interventions: Document food/fluid/supplement intake, Offer support with meals,snacks and hydration    Friction and Shear Interventions: Apply protective barrier, creams and emollients, HOB 30 degrees or less, Minimize layers, Transferring/repositioning devices                Problem: Grieving  Goal: *Able to express feelings of grief  Outcome: Progressing Towards Goal  Goal: *Able to identify stages of grieving process  Outcome: Progressing Towards Goal     Problem: Mood - Altered  Goal: *Alleviation of anxiety and depressive symptoms  Outcome: Progressing Towards Goal     Problem: Discharge Planning  Goal: *Participates in discharge planning  Outcome: Progressing Towards Goal     Problem: Patient Education: Go to Patient Education Activity  Goal: Patient/Family Education  Outcome: Progressing Towards Goal

## 2022-11-21 NOTE — PROGRESS NOTES
0100-Pt reposition in bed. 0258-Pt reposition, increase in air hunger and discomfort, PRN morphine administer, will continue to monitor.

## 2022-11-21 NOTE — PROGRESS NOTES
Pt admitted to  via stretcher, no acute distress or sob, NC in place 3L. Call bell in reach and bed alarm in place.

## 2022-11-21 NOTE — ED PROVIDER NOTES
EMERGENCY DEPARTMENT HISTORY AND PHYSICAL EXAM      Date: 11/20/2022  Patient Name: Davin Augustin    History of Presenting Illness     Chief Complaint   Patient presents with    Other       History Provided By: Patient's Daughter, EMS, and Caregiver    HPI: Davin Augustin, 78 y.o. female brought to ED from [facility] by EMS, per EMS report patient has lung cancer and is on hospice care but were called to bring patient to the ED at patient's daughter's request.  EMS report caregivers reported patient unresponsive but appeared in pain. EMS also reported patient is DNR /DNI. Patient is on morphine for pain, last dose unknown, also oral oxycodone and last dose was unknown. There are no other complaints, changes, or physical findings at this time. Past History     Past Medical History:  Past Medical History:   Diagnosis Date    Hypertension     Lung cancer [facility])        Past Surgical History:  Past Surgical History:   Procedure Laterality Date    HX BUNIONECTOMY         Family History:  History reviewed. No pertinent family history. Social History:  Social History     Tobacco Use    Smoking status: Former     Packs/day: 0.25     Types: Cigarettes    Smokeless tobacco: Never   Substance Use Topics    Alcohol use: Not Currently    Drug use: Not Currently       Allergies:  No Known Allergies    PCP: None    No current facility-administered medications on file prior to encounter. Current Outpatient Medications on File Prior to Encounter   Medication Sig Dispense Refill    traMADoL (ULTRAM) 50 mg tablet Take 50 mg by mouth three (3) times daily as needed. amLODIPine (NORVASC) 5 mg tablet Take 1 Tablet by mouth in the morning. 60 Tablet 0       Review of Systems   Review of Systems   Unable to perform ROS: Mental status change     Physical Exam   Physical Exam  Vitals and nursing note reviewed. Constitutional:       General: She is not in acute distress.      Appearance: She is well-developed. She is ill-appearing and toxic-appearing. She is not diaphoretic. Comments: Emaciated, female who is lethargic and oriented to name and appears in pain. Unable to get any history from her. HENT:      Head: Normocephalic and atraumatic. Jaw: No trismus. Right Ear: External ear normal. No swelling or tenderness. Tympanic membrane is not perforated, erythematous or bulging. Left Ear: External ear normal. No swelling or tenderness. Tympanic membrane is not perforated, erythematous or bulging. Nose: Nose normal. No mucosal edema or rhinorrhea. Right Sinus: No maxillary sinus tenderness or frontal sinus tenderness. Left Sinus: No maxillary sinus tenderness or frontal sinus tenderness. Mouth/Throat:      Mouth: No oral lesions. Dentition: No dental abscesses. Pharynx: Uvula midline. No oropharyngeal exudate, posterior oropharyngeal erythema or uvula swelling. Tonsils: No tonsillar abscesses. Eyes:      General: No scleral icterus. Right eye: No discharge. Left eye: No discharge. Conjunctiva/sclera: Conjunctivae normal.   Cardiovascular:      Rate and Rhythm: Regular rhythm. Tachycardia present. Heart sounds: Normal heart sounds. No murmur heard. No friction rub. No gallop. Pulmonary:      Effort: Pulmonary effort is normal. No tachypnea, accessory muscle usage or respiratory distress. Breath sounds: Normal breath sounds. No decreased breath sounds, wheezing, rhonchi or rales. Abdominal:      General: Abdomen is flat. Bowel sounds are normal.      Palpations: Abdomen is soft. Tenderness: There is no abdominal tenderness. Musculoskeletal:         General: No tenderness. Normal range of motion. Cervical back: Normal range of motion and neck supple. Lymphadenopathy:      Cervical: No cervical adenopathy. Skin:     General: Skin is warm and dry. Findings: No erythema or rash.    Neurological: Mental Status: She is alert. Comments: Lethargic, oriented to name, unable to follow commands. Purposefully moves all extremities. Withdraws all extremities to pain. Psychiatric:         Judgment: Judgment normal.       Lab and Diagnostic Study Results   Labs -   No results found for this or any previous visit (from the past 12 hour(s)). Radiologic Studies -   @lastxrresult@  CT Results  (Last 48 hours)      None          CXR Results  (Last 48 hours)      None            Medical Decision Making and ED Course   Differential Diagnosis & Medical Decision Making Provider Note:       - I am the first provider for this patient. I reviewed the vital signs, available nursing notes, past medical history, past surgical history, family history and social history. The patients presenting problems have been discussed, and they are in agreement with the care plan formulated and outlined with them. I have encouraged them to ask questions as they arise throughout their visit. Vital Signs-Reviewed the patient's vital signs. Patient Vitals for the past 12 hrs:   Temp Pulse Resp BP SpO2   11/20/22 2217 -- (!) 127 (!) 32 (!) 84/45 100 %   11/20/22 2016 98.3 °F (36.8 °C) (!) 144 30 (!) 69/47 100 %       ED Course:      Reviewed her records from [address] [place] [address] and also reviewed patient's old medical records. Patient has lung cancer with mets to brain. I was recently transferred from here to [facility] then discharged from [facility] to [address] [place] [address] with hospice referral.  She has poor prognosis. Discussed with patient's daughter initially over the phone who indicated she needed patient transported to ED because she appeared in a lot of pain. She also indicated she was told patient was therefore physical therapy but patient was unable to do physical therapy and did not seem to be getting enough pain medications.   Patient daughter indicated she was aware of poor prognosis and that that her mother had signed the DNR DNI    I discussed that with her daughter options of aggressive treatment of possible infection or proceeding with hospice and comfort care measures only. Daughter signed DNR and preferred comfort care measures only however she wanted patient admitted here and not returned to [address] [place] [address] today. Discussed with hospitalist above and patient is being admitted for comfort care measures only and case management consult. Procedures     Disposition   Disposition: Admitted to Observation Unit the case was discussed with the admitting hospitalist,Lionel NP: Attending Usha Escobar. Diagnosis/Clinical Impression     Clinical Impression:   1. Terminal illness        Attestations: I, Lazaro Silva MD, am the primary clinician of record. Please note that this dictation was completed with [employer], the computer voice recognition software. Quite often unanticipated grammatical, syntax, homophones, and other interpretive errors are inadvertently transcribed by the computer software. Please disregard these errors. Please excuse any errors that have escaped final proofreading. Thank you.

## 2022-11-22 NOTE — PROGRESS NOTES
Provider notified of 0 breaths per minute respirations and no palpable heart rate. Time of death proclaimed for 1930. Family visiting with patient at bedside and call bell within reach of family. Door shut for privacy. RN supervisor notified of time of death.

## 2022-11-22 NOTE — PROGRESS NOTES
Family going home, all jewelry sent home with family. Shirt removed and placed in personal bag with body. [facility] home left with body.

## 2022-11-22 NOTE — DISCHARGE SUMMARY
Death Summary     Patient ID:    Kael Alvarado  [id number]  71 y.o.  1955    Admit date: 2022    Date and time of death: 2022 @ 1930    Chronic Diagnoses:    Problem List as of 2022 Never Reviewed            Codes Class Noted - Resolved    Terminal illness ICD-10-CM: R69  ICD-9-CM: 799.9  2022 - Present        Lung mass ICD-10-CM: R91.8  ICD-9-CM: 786.6  2022 - Present        COVID-19 ICD-10-CM: U07.1  ICD-9-CM: 079.89  2022 - Present        Syncope and collapse ICD-10-CM: R55  ICD-9-CM: 780.2  2022 - Present       22    Final Diagnoses: Active Problems:    Terminal illness (2022)        Reason for Hospitalization:    Gerhardt Loots is a 78 y.o. female brought in by daughter from [address] after pain being out of control while patient is on hospice and comfort measures. Patient is alert and oriented x1 only and unable to provide appropriate ROS in HPI however states currently her pain is controlled. Per daughter in ED, patient was not managed appropriately by hospice while only on tramadol at [address]. Patient with significant PMH of metastatic Right lung cancer diagnosed in 2022 status post bronchoscopy and evaluation by oncology as well. Patient with complete white out of the right lung during that hospitalization and discharged to [place] [address] home for hospice per family, admitted with comfort measure only. I was called to the bedside to pronounce that Gerhardt Loots was . No spontaneous movement was present. There was no verbal or tactile response. No carotid pulses was palpable and there was no heart sounds auscultated over the precordium. No breath sounds appreciated in any lung fields. Patient pronounced  on 22 @ 1930. Family at the bedside notified, x 2 granddaughters at the bedside who stated they will notify their mother.        Signed:  Liv Speaks, ACNP  11/21/2022  8:01 PM

## 2022-11-22 NOTE — PROGRESS NOTES
Patient released by eye bank. Patient collected by Waldo Duarte of [facility]. Patient's bag of personal belongings sent with Mr. Jenna Frye to  home.

## 2024-03-16 NOTE — H&P
History and Physical    Subjective:     Leidy Gallo is a 73 y.o. -American female with a past medical history for Hypertension, presents to the ED with a chief complaint of syncope and collapse. Patient reports that she was out shopping in the [address] [place] [address], when she became dizzy and then fainted. Other accompanying symptoms includes, weakness, fatigue, and a dry cough, patient denies shortness of breath, chest pain, nausea, vomiting weakness, or drastic weight loss. In the ED patient HH 8.4/7.9, blood cultures pending, rapid Covid positive, CTA chest: Correlating with abnormality on prior chest radiograph, there is a large suprahilar right upper lobe mass measuring 8.3 cm, almost certainly representing primary malignancy. Additional metastatic right upper lobe pulmonary nodules. 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 Metastatic mediastinal and right hilar lymphadenopathy, CXR: shown Large perihilar right upper lobe mass, highly suspicious for primary malignancy, and EKG ST. While patient was in the ED, results of CTA and CXR were reviewed, patient agreed to transfer to [facility] for oncologist consultation. Patient accepted by Dr. Annika Pfeiffer, hospitalist was consulted for observation admission unitl patient has a bed at [facility], no beds tonight, hopefully can be transferred tomorrow. Patient assessed in the ED, at the bedside, patient is alert and oriented, there is no acute distress noted. Patient agrees to admission for a diagnosis of Syncope and Collapse and incidental finding of a lung mass, treatment to include cardiac monitoring, orthostatic BP, ECHO, and CT head pending. Admit to telemetry. Past Medical History:   Diagnosis Date    Hypertension       History reviewed. No pertinent surgical history. History reviewed. No pertinent family history.    Social History     Tobacco Use Smoking status: Former     Packs/day: 0.25     Types: Cigarettes    Smokeless tobacco: Never   Substance Use Topics    Alcohol use: Not Currently       Prior to Admission medications    Not on File     No Known Allergies       REVIEW OF SYSTEMS:       Total of 12 systems reviewed as follows:    Positive = Red  Constitutional: Positive for malaise/fatigue and weakness, negative for fever and chills   HENT: Negative for ear pain, headaches, negative for loss of sense of taste and smell   Eyes: Negative for blurred vision and double vision   Skin: Negative for itching, negative for open areas   Cardiovascular: Negative for chest pain, palpitations, negative for swelling   Respiratory: Negative for shortness or breath, positive for cough, negative for sputum production   Gastrointestinal: Negative for abdominal pain, constipation, nausea, vomiting, and diarrhea   Genitourinary: Negative for dysuria, frequency, and hematuria   Musculoskeletal: Negative for joint pain and myalgias   Neurological: Positive for dizziness with fainting episode. Psychiatric: Negative for depression and anxiousness     Objective:   VITALS:    Visit Vitals  /78 (BP 1 Location: Right upper arm)   Pulse 94   Temp 99.3 °F (37.4 °C)   Resp 18   Ht 5' 7\" (1.702 m)   Wt 66.2 kg (146 lb)   SpO2 98%   BMI 22.87 kg/m²       PHYSICAL EXAM:  Positive = Red  Constitutional: Alert and oriented x 3 and no noted acute distress appears to be stated age.    HENT: Atraumatic, nose midline, oropharynx clear ad moist, trachea midline, no supraclavicular   Eyes: Conjunctiva normal and pupils equal   Skin: Dry, intact, warm, and dry   Cardiovascular: Regular rate and rhythm, normal heart sounds, no murmurs, pulses palpable, no noted edema   Respiratory: Lungs clear throughout, no wheezes, rales, or rhonchi, effort normal   Gastrointestinal: Appearance normal, bowel sounds are normal, bowl soft and non-tender   Genitourinary: Deferred   Musculoskeletal: Normal ROM   Neurological: Alert and oriented x 3, awake. No facial droop. No slurred speech. Hand grasps equal. Strength 5/5 in all extremities. Intact sensations   Psychiatric: Affect normal, Answers questions appropriately     __________________________________________________  Care Plan discussed with:    Comments   Patient X    Family      RN     Care Manager                    Consultant:      _______________________________________________________________________  Expected  Disposition:   Home with Family X   HH/PT/OT/RN    SNF/LTC    ZARINA    ________________________________________________________________________    Labs:  Recent Results (from the past 24 hour(s))   EKG, 12 LEAD, INITIAL    Collection Time: 07/31/22  3:12 PM   Result Value Ref Range    Ventricular Rate 112 BPM    Atrial Rate 112 BPM    P-R Interval 117 ms    QRS Duration 74 ms    Q-T Interval 303 ms    QTC Calculation (Bezet) 414 ms    Calculated P Axis 70 degrees    Calculated R Axis 54 degrees    Calculated T Axis 33 degrees    Diagnosis       Sinus tachycardia  Probable left atrial enlargement     METABOLIC PANEL, COMPREHENSIVE    Collection Time: 07/31/22  3:45 PM   Result Value Ref Range    Sodium 136 136 - 145 mmol/L    Potassium 3.7 3.5 - 5.5 mmol/L    Chloride 104 100 - 111 mmol/L    CO2 22 21 - 32 mmol/L    Anion gap 10 3.0 - 18.0 mmol/L    Glucose 133 (H) 74 - 99 mg/dL    BUN 20 (H) 7 - 18 mg/dL    Creatinine 1.05 0.60 - 1.30 mg/dL    BUN/Creatinine ratio 19 12 - 20      GFR est AA >60 >60 ml/min/1.73m2    GFR est non-AA 53 (L) >60 ml/min/1.73m2    Calcium 8.4 (L) 8.5 - 10.1 mg/dL    Bilirubin, total 1.0 0.2 - 1.0 mg/dL    AST (SGOT) 33 10 - 38 U/L    ALT (SGPT) 14 13 - 56 U/L    Alk.  phosphatase 50 45 - 117 U/L    Protein, total 7.5 6.4 - 8.2 g/dL    Albumin 2.7 (L) 3.4 - 5.0 g/dL    Globulin 4.8 (H) 2.0 - 4.0 g/dL    A-G Ratio 0.6 (L) 0.8 - 1.7     CBC WITH AUTOMATED DIFF    Collection Time: 07/31/22  3:45 PM   Result Value Ref Range WBC 12.0 4.6 - 13.2 K/uL    RBC 3.19 (L) 4.20 - 5.30 M/uL    HGB 8.4 (L) 12.0 - 16.0 g/dL    HCT 27.9 (L) 35.0 - 45.0 %    MCV 87.5 78.0 - 100.0 FL    MCH 26.3 24.0 - 34.0 PG    MCHC 30.1 (L) 31.0 - 37.0 g/dL    RDW 16.3 (H) 11.6 - 14.5 %    PLATELET 523 634 - 368 K/uL    MPV 12.1 (H) 9.2 - 11.8 FL    NRBC 0.0 0.0  WBC    ABSOLUTE NRBC 0.00 0.00 - 0.01 K/uL    NEUTROPHILS 88 (H) 40 - 73 %    LYMPHOCYTES 5 (L) 21 - 52 %    MONOCYTES 5 3 - 10 %    EOSINOPHILS 1 0 - 5 %    BASOPHILS 0 0 - 2 %    IMMATURE GRANULOCYTES 1 (H) 0 - 0.5 %    ABS. NEUTROPHILS 10.5 (H) 1.8 - 8.0 K/UL    ABS. LYMPHOCYTES 0.6 (L) 0.9 - 3.6 K/UL    ABS. MONOCYTES 0.6 0.05 - 1.2 K/UL    ABS. EOSINOPHILS 0.1 0.0 - 0.4 K/UL    ABS. BASOPHILS 0.0 0.0 - 0.1 K/UL    ABS. IMM.  GRANS. 0.1 (H) 0.00 - 0.04 K/UL    DF AUTOMATED     INFLUENZA A & B AG (RAPID TEST)    Collection Time: 07/31/22  4:00 PM   Result Value Ref Range    Influenza A Antigen Negative Negative      Influenza B Antigen Negative Negative     COVID-19 RAPID TEST    Collection Time: 07/31/22  4:00 PM   Result Value Ref Range    COVID-19 rapid test DETECTED (A) Not Detected     LACTIC ACID    Collection Time: 07/31/22  4:30 PM   Result Value Ref Range    Lactic acid 1.9 0.4 - 2.0 mmol/L   URINALYSIS W/ RFLX MICROSCOPIC    Collection Time: 07/31/22  6:45 PM   Result Value Ref Range    Color Yellow      Appearance Clear      Specific gravity 1.024 1.005 - 1.030      pH (UA) 6.0 5.0 - 8.0      Protein Trace (A) Negative mg/dL    Glucose Negative Negative mg/dL    Ketone Negative Negative mg/dL    Bilirubin Negative Negative      Blood Negative Negative      Urobilinogen 1.0 0.2 - 1.0 EU/dL    Nitrites Negative Negative      Leukocyte Esterase Negative Negative     LACTIC ACID    Collection Time: 07/31/22  6:45 PM   Result Value Ref Range    Lactic acid 0.8 0.4 - 2.0 mmol/L   URINE MICROSCOPIC    Collection Time: 07/31/22  6:45 PM   Result Value Ref Range    WBC 0-4 0 - 4 /hpf    RBC 0-5 0 - 2 /hpf    Epithelial cells Moderate 0 - 20 /lpf    Bacteria Negative (A) None /hpf       Imaging:  CTA CHEST W OR W WO CONT    Result Date: 7/31/2022  EXAM: CTA CHEST W OR W WO CONT CLINICAL INDICATION/HISTORY: Large right lung mass on prior chest radiograph, syncopal episode COMPARISON: Chest radiograph same day TECHNIQUE: Thin section CT was performed through the chest during pulmonary arterial enhancement. MIP 3D reconstructions were generated to increase sensitivity in the detection of pulmonary emboli. One or more dose reduction techniques were used on this CT: automated exposure control, adjustment of the mAs and/or kVp according to patient size, and iterative reconstruction techniques. The specific techniques used on this CT exam have been documented in the patient's electronic medical record. Digital Imaging and Communications in Medicine (DICOM) format image data are available to nonaffiliated external healthcare facilities or entities on a secure, media free, reciprocally searchable basis with patient authorization for at least a 12-month period after this study. --- EXAM QUALITY --- 1. Overall exam quality- satisfactory: adequate 2. Adequacy of pulmonary enhancement-adequate: sufficient enhancement for diagnosis down to and including subsegmental vessels. Main PA density 190-250 HU 3. Adequacy of breath hold- adequate: sufficient enough to render diagnosis 4. There are no significant noise, beam hardening, streak artifacts affecting scan quality. _______________ FINDINGS: ---  PULMONARY CT ANGIOGRAM  --- PULMONARY VASCULATURE: The right and left central, primary segmental and subsegmental pulmonary arteries appear patent. There is no convincing evidence of intraluminal filling defect identified to suggest pulmonary embolism. THORACIC AORTA: Normal caliber thoracic aorta. No aortic aneurysm, intramural hematoma or dissection.  ---  CT CHEST --- LUNG PARENCHYMA:   Right lung apex pulmonary nodule (image 20) measures 10 x 7 mm in size. Large suprahilar right upper lobe mass, correlating with prior findings on chest radiograph measures approximately 8.3 x 7.2 cm. There is an additional spiculated nodule within the anteroinferior aspect of the right upper lobe (image 62), measuring 1.8 x 2.2 cm. Bilobed mass is seen along the posterior aspect of the right upper lobe (image 34), measuring 1.1 x 2.2 cm. A few regions of interstitial thickening and nodularity throughout inferior and inferolateral aspects of the right upper lobe suggest lymphangitic carcinomatosis. Emphysematous changes are seen elsewhere throughout the lungs. No suspicious pulmonary nodule or mass throughout the left lung or right lower lobe. PLEURAL SPACES:   No pleural effusion or pneumothorax. MEDIASTINUM:   Enlarged precarinal mediastinal lymph node measures 2.4 cm short axis. Enlarged subcarinal lymph node measures 1.5 cm short axis. There are several enlarged right hilar lymph nodes, measuring up to 2.2 cm short axis. No supraclavicular or left hilar lymphadenopathy. No axillary lymphadenopathy. No global cardiomegaly. No pericardial effusion. OSSEOUS STRUCTURES:   No acute osseous abnormality. Mild thoracic degenerative disk disease. UPPER ABDOMEN: There are a few simple appearing left renal cysts. No follow-up imaging is recommended as incidental lesions are likely benign. _______________     1. Correlating with abnormality on prior chest radiograph, there is a large suprahilar right upper lobe mass measuring 8.3 cm, almost certainly representing primary malignancy. Additional metastatic right upper lobe pulmonary nodules. 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 Metastatic mediastinal and right hilar lymphadenopathy. Primary right upper lobe mass would be amenable to either CT-guided core biopsy or tissue sampling via bronchoscopy.  2. No evidence of pulmonary thromboembolic disease. XR CHEST PORT    Result Date: 7/31/2022  EXAM: CHEST RADIOGRAPH, SINGLE VIEW CLINICAL INDICATION/HISTORY: Fever, sepsis COMPARISON: None. TECHNIQUE: Portable frontal view of the chest was obtained. _______________ FINDINGS: SUPPORT DEVICES: None. HEART AND MEDIASTINUM: Cardiomediastinal silhouette appears within normal limits. Normal caliber thoracic aorta. No central vascular congestion. LUNGS AND PLEURAL SPACES: Large mass obscures medial inferior aspects of the right upper lobe, silhouetting the adjacent right hilum. Remaining lung parenchyma appears otherwise adequately aerated. No pleural effusion or pneumothorax. BONY THORAX AND SOFT TISSUES: No acute osseous abnormality. _______________     Large perihilar right upper lobe mass, highly suspicious for primary malignancy. Assessment & Plan:     Syncope and collapse  -start IV hydration  -EKG/ continuous telemetry monitoring   -check orthostatic BP  -I&O  -CBC, BMP  -ECHO ordered  -CT head: ordered    Lung mass  -patient has been accepted to [facility], but no beds available at this time  -CT Chest: there is a large suprahilar right upper lobe mass measuring 8.3 cm, almost certainly representing primary malignancy. Additional metastatic right upper lobe pulmonary nodules. 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  Metastatic mediastinal and right hilar lymphadenopathy  -CXR: Large perihilar right upper lobe mass, highly suspicious for primary malignancy    COVID-19  -confirmed on 7/31/22  -CXR: Large perihilar right upper lobe mass, highly suspicious for primary malignancy  -supplemental O2 as needed, keep O2 saturation >92%, currently stable on room air  -Tylenol PRN for fever  -Isolation precautions implemented    TOTAL TIME:  45 Minutes    Code Status: Full    Prophylaxis:  Lovenox    Electronically Signed : Carlos Rivera, [facility] Medicine Service    Please note that this dictation was completed with [employer], the computer voice recognition software. Quite often unanticipated grammatical, syntax, homophones, and other interpretive errors are inadvertently transcribed by the computer software. Please disregard these errors. Please excuse any errors that have escaped final proofreading. Thank you. no

## 2025-06-12 NOTE — ASSESSMENT & PLAN NOTE
-confirmed on 7/31/22  -CXR: Large perihilar right upper lobe mass, highly suspicious for primary malignancy  -supplemental O2 as needed, keep O2 saturation >92%, currently stable on room air  -Tylenol PRN for fever  -Isolation precautions implemented Rectal pain